# Patient Record
Sex: FEMALE | Race: WHITE | NOT HISPANIC OR LATINO | Employment: OTHER | URBAN - METROPOLITAN AREA
[De-identification: names, ages, dates, MRNs, and addresses within clinical notes are randomized per-mention and may not be internally consistent; named-entity substitution may affect disease eponyms.]

---

## 2017-01-27 ENCOUNTER — ALLSCRIPTS OFFICE VISIT (OUTPATIENT)
Dept: OTHER | Facility: OTHER | Age: 82
End: 2017-01-27

## 2018-01-09 NOTE — RESULT NOTES
Verified Results  Coumadin Flow Sheet [id number] 07:01PM Concepcion Arboleda     Test Name Result Flag Reference   Recheck INR [id number]     New Dose same       Coumadin Flow Sheet [id number] 02:55PM Concepcion Arboleda     Test Name Result Flag Reference   INR 2 1     Current Dose 1mg alt 0 5mg

## 2018-01-09 NOTE — RESULT NOTES
Verified Results  Coumadin Flow Sheet [id number] 01:23PM Kayleigh Webb     Test Name Result Flag Reference   INR 2 2     Current Dose 1mg alt 0 5mg

## 2018-01-13 NOTE — RESULT NOTES
Verified Results  Coumadin Flow Sheet [id number] 12:00AM Kelly Velázquez     Test Name Result Flag Reference   INR 2 0     Current Dose 1mg alt 0 5mg

## 2018-01-13 NOTE — PROCEDURES
Procedures by Joanna Herrera RN at 11/3/2016 11:15 AM      Author: Joanna Herrera RN Service:  (none) Author Type:  Registered Nurse     Filed:  11/3/2016 11:19 AM Date of Service:  11/3/2016 11:15 AM Status:  Signed     : Joanna Herrera RN (Registered Nurse)         Procedure Orders:       1  Insert PICC line [id number] ordered by Nikki Barrios MD at 11/02/16 1328                    Insert PICC line  Date/Time: 11/3/2016 11:16 AM  Performed by: Suzanne Gay by: Adelia Quach     Patient location:  Bedside  Consent:     Consent obtained:  Written    Consent given by:  Marie Keating (pt's son)    Procedural risks discussed: consent obtained by physician  [place] protocol:     Procedure explained and questions answered to patient or proxy's satisfaction: yes      Relevant documents present and verified: yes      Test results available and properly labeled: yes       Imaging studies available: yes      Required blood products, implants, devices, and special equipment available: yes      Site/side marked: yes      Immediately prior to procedure, a time out was called: yes      Patient identity confirmed:  Verbally with patient, arm band, provided demographic data and hospital-assigned identification number  Pre-procedure details:     Hand hygiene: Hand hygiene performed prior to insertion      Sterile barrier technique: All elements of maximal sterile technique followed      Skin preparation:  ChloraPrep    Skin preparation agent: Skin preparation agent completely dried prior to procedure    Indications:     PICC line indications: total parenteral nutrition    Anesthesia (see MAR for exact dosages):      Anesthesia method:  Local infiltration (2ml)    Local anesthetic:  Lidocaine 1% w/o epi  Procedure details:     Location:  Brachial    Vessel type: vein      Laterality:  Right    Approach: percutaneous technique used      Patient position:  Flat    Procedural supplies: Double lumen    Catheter size:  5 Fr    Landmarks identified: yes      Ultrasound guidance: yes      Sterile ultrasound techniques: Sterile gel and sterile probe covers were used      Number of attempts:  1    Successful placement: yes      Vessel of catheter tip end:  Svc    Total catheter length (cm):  39    Catheter out on skin (cm):  0    Max flow rate:  999ml/hr    Arm circumference:  32cm  Post-procedure details:     Post-procedure:  Dressing applied and securement device placed    Assessment:  Blood return through all ports and free fluid flow (placement verified with sherlock 3cg confirmation, placed on chart)    Post-procedure complications: none      Patient tolerance of procedure:   Tolerated well, no immediate complications  Comments:      Wbc 14 42 at time of insertion                     Received for:Provider  EPIC   Nov  3 2016 11:19AM Raul Hebert Standard Time

## 2018-01-14 VITALS — DIASTOLIC BLOOD PRESSURE: 73 MMHG | SYSTOLIC BLOOD PRESSURE: 126 MMHG | HEART RATE: 80 BPM

## 2018-01-15 NOTE — RESULT NOTES
Verified Results  Coumadin Flow Sheet [id number] 04:02PM Carlos Lewis     Test Name Result Flag Reference   INR 2 3     Current Dose 1mg alt 0 5mg

## 2018-01-16 NOTE — RESULT NOTES
Verified Results  Coumadin Flow Sheet [id number] 03:16PM Jemma Farah     Test Name Result Flag Reference   INR 2 8     Current Dose 0hhnda0 5mg

## 2018-01-16 NOTE — RESULT NOTES
Verified Results  Coumadin Flow Sheet [id number] 12:00AM Carlos Lewis     Test Name Result Flag Reference   INR 1 8     Current Dose 1mg alt 0 5mg

## 2018-01-18 NOTE — RESULT NOTES
Verified Results  Coumadin Flow Sheet [id number] 01:27PM Sarahorry Odalis Keller     Test Name Result Flag Reference   INR 2 2     Current Dose 1mg alt 0 5mg

## 2018-01-18 NOTE — RESULT NOTES
Verified Results  Coumadin Flow Sheet [id number] 02:45PM Kelly Velázquez     Test Name Result Flag Reference   INR 2 4     Current Dose 1mg alt 0 5mg

## 2018-04-24 ENCOUNTER — TRANSCRIBE ORDERS (OUTPATIENT)
Dept: FAMILY MEDICINE CLINIC | Facility: CLINIC | Age: 83
End: 2018-04-24

## 2018-05-14 ENCOUNTER — HOSPITAL ENCOUNTER (OUTPATIENT)
Dept: ULTRASOUND IMAGING | Facility: CLINIC | Age: 83
Discharge: HOME | End: 2018-05-14
Admitting: RADIOLOGY
Payer: MEDICARE

## 2018-05-14 ENCOUNTER — HOSPITAL ENCOUNTER (OUTPATIENT)
Dept: ULTRASOUND IMAGING | Facility: CLINIC | Age: 83
Discharge: HOME | End: 2018-05-14
Payer: MEDICARE

## 2018-05-14 ENCOUNTER — HOSPITAL ENCOUNTER (OUTPATIENT)
Dept: MAMMOGRAPHY | Facility: CLINIC | Age: 83
Discharge: HOME | End: 2018-05-14
Payer: MEDICARE

## 2018-05-14 VITALS — DIASTOLIC BLOOD PRESSURE: 60 MMHG | HEART RATE: 76 BPM | SYSTOLIC BLOOD PRESSURE: 110 MMHG

## 2018-05-14 DIAGNOSIS — N63.0 LUMP OR MASS IN BREAST: ICD-10-CM

## 2018-05-14 PROCEDURE — 88361 TUMOR IMMUNOHISTOCHEM/COMPUT: CPT | Performed by: PATHOLOGY

## 2018-05-14 PROCEDURE — 77066 DX MAMMO INCL CAD BI: CPT

## 2018-05-14 PROCEDURE — 88305 TISSUE EXAM BY PATHOLOGIST: CPT | Performed by: PATHOLOGY

## 2018-05-14 PROCEDURE — 19083 BX BREAST 1ST LESION US IMAG: CPT

## 2018-05-14 PROCEDURE — 76642 ULTRASOUND BREAST LIMITED: CPT

## 2018-05-14 RX ORDER — LIDOCAINE HYDROCHLORIDE 10 MG/ML
5 INJECTION, SOLUTION INFILTRATION; PERINEURAL ONCE
Status: COMPLETED | OUTPATIENT
Start: 2018-05-14 | End: 2018-05-14

## 2018-05-14 RX ADMIN — LIDOCAINE HYDROCHLORIDE 5 ML: 10 INJECTION, SOLUTION INFILTRATION; PERINEURAL at 15:04

## 2018-05-14 NOTE — PROGRESS NOTES
Patient arrived via:    _____ambulatory    __x___wheelchair    _____stretcher      Domestic violence screen    __x____negative______positive    Breast Implants:    _______yes ___x_____no

## 2018-05-14 NOTE — PROGRESS NOTES
Ice pack given:    __x___yes _____no    Discharge instructions signed by patient:    __x___yes _____no    Discharge instructions given to patient:    __x___yes _____no    Discharged via:    __x___amulatory    _____wheelchair    _____stretcher    Stable on discharge:    __x___yes ____no

## 2018-05-14 NOTE — PROGRESS NOTES
Procedure type:    __x___ultrasound guided _____stereotactic    Breast:    _____Left __x___Right    Location: 10:00 7cm from nipple    Needle: 14g Maxcore    # of passes: 2    Clip: Hydromark-butterfly    Performed by: Dr Samantha Ross held for 10 minutes by: Rosibel Centeno Strips:    __x___yes _____no    Band aid:    __x___yes_____no    Tape and guaze:    _____yes __x___no    Tolerated procedure:    __x___yes _____no

## 2018-05-14 NOTE — DISCHARGE INSTR - OTHER ORDERS
POST LARGE CORE BREAST BIOPSY PATIENT INFORMATION      1  Place an ice pack inside your bra over the top of the dressing every hour for 20 minutes (20 minutes on, 60 minutes off)  Do this until bedtime  2  Do not shower or bathe until the following morning  3  You may bathe your breast carefully with the steri-strips in place  Be careful    Not to loosen them  The steri-strips will fall off in 3-5 days  4  You may have mild discomfort, and you may have some bruising where the   Needle entered the skin  This should clear within 5-7 days  5  If you need medicine for discomfort, take acetaminophen products such as   Tylenol  You may also take Advil or Motrin products  6  Do not participate in strenuous activities such as-tennis, aerobics, skiing,  Weight lifting, etc  for 24 hours  Refrain from swimming/soaking for 72 hours  7  Wearing a bra for sleeping may be more comfortable for the first 24-48 hours  8  Watch for continued bleeding, pain or fever over 101; please call with any questions or concerns  For procedures done at the [facility]  [facility] [place] [facility] call:  Billy Linda RN at [phone]  Jennifer Edmond RN at [phone]                    *After 4 PM call the Interventional Radiology Department                    [phone] and ask to speak with the nurse on call  For procedures done at the [address] [place] [address] call:         Daquan Narayanan RN at   *After 4 PM call the Interventional Radiology Department   [phone] and ask to speak with the nurse on call  For procedures done at [address] [place] [address] call: The Radiology Nurse at [phone]  *After 4 PM call your physician, or go to the Emergency Department  9          The final results of your biopsy are usually available within one week

## 2018-05-15 NOTE — PROGRESS NOTES
Post procedure call completed 0n 5/15/18 at 1209    Bleeding: _____yes __x___no    Pain: _____yes ____x__no    Redness/Swelling: ___x___yes ______no    Band aid removed: ___x__yes _____no    Steri-Strips intact: __x____yes _____no   Stager with Ms Stephens's nurse at [place]  Her nurse states Ms Roel Payton has some swelling in her right breast, but denies redness or any other problems with biopsy site

## 2018-05-21 ENCOUNTER — TELEPHONE (OUTPATIENT)
Dept: MAMMOGRAPHY | Facility: CLINIC | Age: 83
End: 2018-05-21

## 2018-05-23 ENCOUNTER — OFFICE VISIT (OUTPATIENT)
Dept: SURGICAL ONCOLOGY | Facility: CLINIC | Age: 83
End: 2018-05-23
Payer: MEDICARE

## 2018-05-23 VITALS
SYSTOLIC BLOOD PRESSURE: 130 MMHG | HEART RATE: 74 BPM | TEMPERATURE: 97.9 F | HEIGHT: 57 IN | RESPIRATION RATE: 16 BRPM | WEIGHT: 140 LBS | DIASTOLIC BLOOD PRESSURE: 70 MMHG | BODY MASS INDEX: 30.2 KG/M2

## 2018-05-23 DIAGNOSIS — N63.0 BREAST MASS: Primary | ICD-10-CM

## 2018-05-23 DIAGNOSIS — C50.411 MALIGNANT NEOPLASM OF UPPER-OUTER QUADRANT OF RIGHT BREAST IN FEMALE, ESTROGEN RECEPTOR POSITIVE (HCC): ICD-10-CM

## 2018-05-23 DIAGNOSIS — Z17.0 MALIGNANT NEOPLASM OF UPPER-OUTER QUADRANT OF RIGHT BREAST IN FEMALE, ESTROGEN RECEPTOR POSITIVE (HCC): ICD-10-CM

## 2018-05-23 PROCEDURE — 99204 OFFICE O/P NEW MOD 45 MIN: CPT | Performed by: SURGERY

## 2018-05-23 RX ORDER — B-COMPLEX WITH VITAMIN C
TABLET ORAL
COMMUNITY

## 2018-05-23 NOTE — PROGRESS NOTES
Breast Consultation-Surgical Oncology     [address]  [facility] [place] [address]    Name:  Geovanny Mcbride  YOB: 1919  MRN:  [id number]    Assessment/Plan   Diagnoses and all orders for this visit:    Breast mass    Malignant neoplasm of upper-outer quadrant of right breast in female, estrogen receptor positive ([address] )        Assessment:right breast cancer, ER/AK +    Plan:referral to medical oncology, follow up in 3 months    HPI: Geovanny Mcbride is a 80y o  year old female who presents with a right breast mass  She noted this herself and was sent for diagnostic imaging  Surgical treatment to date consisted of n/a  Oncology History:     No history exists  Pertinent reproductive history:  Age at menarche:  unk  OB History      Para Term  AB Living    3 3            SAB TAB Ectopic Multiple Live Births                     Obstetric Comments    Age at first pregnancy 21          Age at first live birth:  21  Age at menopause:  unk  Hysterectomy/Oophrectomy:  Yes  Hormone replacement therapy:  No  Birth control pills:  No    Problem List:   Patient Active Problem List   Diagnosis    Abdominal pain    Depression    Deep vein thrombosis (DVT) of lower extremity (HCC)    Ischemic bowel disease ([address] )    Protein calorie malnutrition ([address] )    Anemia    Septicemia ([address] )    Breast mass    Malignant neoplasm of upper-outer quadrant of right breast in female, estrogen receptor positive ([address] )     Past Medical History:   Diagnosis Date    Abdominal aortic aneurysm without rupture ([address] )     Anxiety     Atrial fibrillation ([address] )     Blood clot in vein     Breast mass     Cyst of pancreas     Depression     Depression     Difficulty walking     Diverticulitis     DVT (deep venous thrombosis) (HCC)     Dysphagia     Dysphagia, oropharyngeal phase     Frequent urination     Functional urinary incontinence  GERD (gastroesophageal reflux disease)     Hyperlipidemia     Hypertension     Long term (current) use of anticoagulants     Metabolic encephalopathy     Personal history of other malignant neoplasm of large intestine     Unspecified protein-calorie malnutrition (HCC)     Vascular disorder of intestine ([address] )     Wears glasses     Weight loss      Past Surgical History:   Procedure Laterality Date    ABDOMINAL SURGERY      BREAST BIOPSY Right 05/14/2018    HYSTERECTOMY      IVC FILTER INSERTION  1990    KIDNEY STONE SURGERY      LAPAROTOMY N/A 11/1/2016    Procedure: EXPLORATORY LAPAROTOMY WITH EXTENSIVE LYSIS OF ADHESIONS, SEGMENTAL SMALL BOWEL RESECTION WITH PRIMARY ANASTAMOSIS, REPAIR OF MULTIPLE SMALL BOWEL ENTEROTOMIES;  Surgeon: Toro Landis MD;  Location: [facility] OR;  Service:     SPINE SURGERY       Family History   Problem Relation Age of Onset    Family history unknown: Yes     Social History     Social History    Marital status:      Spouse name: N/A    Number of children: N/A    Years of education: N/A     Occupational History    Not on file  Social History Main Topics    Smoking status: Former Smoker    Smokeless tobacco: Never Used    Alcohol use Yes      Comment: special occasions    Drug use: No    Sexual activity: Not on file     Other Topics Concern    Not on file     Social History Narrative    No narrative on file     Current Outpatient Prescriptions   Medication Sig Dispense Refill    Calcium Carbonate-Vitamin D 600-200 MG-UNIT TABS Take by mouth      Multiple Vitamin (MULTIVITAMINS PO) Take by mouth      warfarin (COUMADIN) 1 mg tablet Take by mouth      Escitalopram Oxalate (LEXAPRO PO) Take 5 mg by mouth      polyethylene glycol (GLYCOLAX) powder Take 17 g by mouth daily for 30 days 510 g 0     No current facility-administered medications for this visit        Allergies   Allergen Reactions    Ofloxacin          The following portions of the patient's history were reviewed and updated as appropriate: allergies, current medications, past family history, past medical history, past social history, past surgical history and problem list     Review of Systems:  Review of Systems   Constitutional: Positive for unexpected weight change (weight loss)  Negative for appetite change and fever  Eyes: Negative  Respiratory: Negative for shortness of breath  Cardiovascular: Negative  Gastrointestinal: Negative  Endocrine: Negative  Genitourinary: Positive for frequency  Musculoskeletal: Negative  Negative for arthralgias and myalgias  Skin: Negative  Allergic/Immunologic: Negative  Neurological: Negative  Hematological: Negative  Negative for adenopathy  Does not bruise/bleed easily  Psychiatric/Behavioral: Negative  Physical Exam:  Physical Exam   Constitutional: She is oriented to person, place, and time  She appears well-developed and well-nourished  In wheelchair   HENT:   Head: Normocephalic and atraumatic  Cardiovascular: Normal heart sounds  Pulmonary/Chest: Breath sounds normal  Right breast exhibits mass (right upper outer), skin change (well healing biopsy site with resolving ecchymosis) and tenderness  Right breast exhibits no inverted nipple and no nipple discharge  Left breast exhibits no inverted nipple, no mass, no nipple discharge, no skin change and no tenderness  Lymphadenopathy:        Right axillary: No pectoral and no lateral adenopathy present  Left axillary: No pectoral and no lateral adenopathy present  Right: No supraclavicular adenopathy present  Left: No supraclavicular adenopathy present  Neurological: She is alert and oriented to person, place, and time  Psychiatric: She has a normal mood and affect         Laboratory:    Pathology revealed: invasive ductal carcinoma    Histologic grade: moderately differentiated     Angiolymphatic invasion:  absent    Tumor node status: clinically Negative    Hormone receptor status:  estrogen receptor positive        [email or web address] bilateral diagnostic mammogram and bilateral breast ultrasound is a BI-RADS five secondary to a large greater than 3 cm mass in the upper outer right breast corresponding to the palpable region  There was also a bilobed area on the left side subcentimeter in size, decision was made not to biopsy this area secondary to patient age and expression of not wanting any surgical management  Discussion/Summary:  80-year-old female here today secondary to a right breast mass  This is a biopsy-proven carcinoma  Fortunately this is strongly estrogen and progesterone receptor positive at 100%  I discussed the overall management of breast cancer with her and her family who accompany her today  She is not interested in any surgical management  Given the strong estrogen/progesterone drive to her tumor, I am referring her to Medical Oncology to discuss anti hormone therapy  She is agreeable to this plan  I will see her again in three months to assess response  If she is clinically responding, then she would not likely need any surgical management

## 2018-05-23 NOTE — LETTER
May 23, 2018     Lenny Nair, [address] [address]    Patient: Jean Claude Cooper   YOB: 1919   Date of Visit: 2018       Dear Dr Alas King: Thank you for referring Jean Claude Cooper to me for evaluation  Below are my notes for this consultation  If you have questions, please do not hesitate to call me  I look forward to following your patient along with you  Sincerely,        Katlyn Gordon MD        CC: MD Katlyn Lewis MD  2018  2:28 PM  Sign at close encounter    Breast Consultation-Surgical Oncology     [address] [facility] [place] [address]    Name:  Jean Claude Cooper  YOB: 1919  MRN:  [id number]    Assessment/Plan   Diagnoses and all orders for this visit:    Breast mass    Malignant neoplasm of upper-outer quadrant of right breast in female, estrogen receptor positive ([address] )        Assessment:right breast cancer, ER/[place] +    Plan:referral to medical oncology, follow up in 3 months    HPI: Jean Claude Cooper is a 80y o  year old female who presents with a right breast mass  She noted this herself and was sent for diagnostic imaging  Surgical treatment to date consisted of n/a  Oncology History:     No history exists  Pertinent reproductive history:  Age at menarche:  unk  OB History      Para Term  AB Living    3 3            SAB TAB Ectopic Multiple Live Births                     Obstetric Comments    Age at first pregnancy 21          Age at first live birth:  21  Age at menopause:  unk  Hysterectomy/Oophrectomy:  Yes  Hormone replacement therapy:  No  Birth control pills:  No    Problem List:   Patient Active Problem List   Diagnosis    Abdominal pain    Depression    Deep vein thrombosis (DVT) of lower extremity (HCC)    Ischemic bowel disease ([address] )    Protein calorie malnutrition ([address] )    Anemia    Septicemia [facility]    Breast mass    Malignant neoplasm of upper-outer quadrant of right breast in female, estrogen receptor positive ([facility] )     Past Medical History:   Diagnosis Date    Abdominal aortic aneurysm without rupture (HCC)     Anxiety     Atrial fibrillation (HCC)     Blood clot in vein     Breast mass     Cyst of pancreas     Depression     Depression     Difficulty walking     Diverticulitis     DVT (deep venous thrombosis) (HCC)     Dysphagia     Dysphagia, oropharyngeal phase     Frequent urination     Functional urinary incontinence     GERD (gastroesophageal reflux disease)     Hyperlipidemia     Hypertension     Long term (current) use of anticoagulants     Metabolic encephalopathy     Personal history of other malignant neoplasm of large intestine     Unspecified protein-calorie malnutrition (HCC)     Vascular disorder of intestine ([facility] )     Wears glasses     Weight loss      Past Surgical History:   Procedure Laterality Date    ABDOMINAL SURGERY      BREAST BIOPSY Right 05/14/2018    HYSTERECTOMY      IVC FILTER INSERTION  1990    KIDNEY STONE SURGERY      LAPAROTOMY N/A 11/1/2016    Procedure: EXPLORATORY LAPAROTOMY WITH EXTENSIVE LYSIS OF ADHESIONS, SEGMENTAL SMALL BOWEL RESECTION WITH PRIMARY ANASTAMOSIS, REPAIR OF MULTIPLE SMALL BOWEL ENTEROTOMIES;  Surgeon: Remedios Kirk MD;  Location: [facility];  Service:     SPINE SURGERY       Family History   Problem Relation Age of Onset    Family history unknown: Yes     Social History     Social History    Marital status:      Spouse name: N/A    Number of children: N/A    Years of education: N/A     Occupational History    Not on file       Social History Main Topics    Smoking status: Former Smoker    Smokeless tobacco: Never Used    Alcohol use Yes      Comment: special occasions    Drug use: No    Sexual activity: Not on file     Other Topics Concern    Not on file     Social History Narrative    No narrative on file     Current Outpatient Prescriptions   Medication Sig Dispense Refill    Calcium Carbonate-Vitamin D 600-200 MG-UNIT TABS Take by mouth      Multiple Vitamin (MULTIVITAMINS PO) Take by mouth      warfarin (COUMADIN) 1 mg tablet Take by mouth      Escitalopram Oxalate (LEXAPRO PO) Take 5 mg by mouth      polyethylene glycol (GLYCOLAX) powder Take 17 g by mouth daily for 30 days 510 g 0     No current facility-administered medications for this visit  Allergies   Allergen Reactions    Ofloxacin          The following portions of the patient's history were reviewed and updated as appropriate: allergies, current medications, past family history, past medical history, past social history, past surgical history and problem list     Review of Systems:  Review of Systems   Constitutional: Positive for unexpected weight change (weight loss)  Negative for appetite change and fever  Eyes: Negative  Respiratory: Negative for shortness of breath  Cardiovascular: Negative  Gastrointestinal: Negative  Endocrine: Negative  Genitourinary: Positive for frequency  Musculoskeletal: Negative  Negative for arthralgias and myalgias  Skin: Negative  Allergic/Immunologic: Negative  Neurological: Negative  Hematological: Negative  Negative for adenopathy  Does not bruise/bleed easily  Psychiatric/Behavioral: Negative  Physical Exam:  Physical Exam   Constitutional: She is oriented to person, place, and time  She appears well-developed and well-nourished  In wheelchair   HENT:   Head: Normocephalic and atraumatic  Cardiovascular: Normal heart sounds  Pulmonary/Chest: Breath sounds normal  Right breast exhibits mass (right upper outer), skin change (well healing biopsy site with resolving ecchymosis) and tenderness  Right breast exhibits no inverted nipple and no nipple discharge   Left breast exhibits no inverted nipple, no mass, no nipple discharge, no skin change and no tenderness  Lymphadenopathy:        Right axillary: No pectoral and no lateral adenopathy present  Left axillary: No pectoral and no lateral adenopathy present  Right: No supraclavicular adenopathy present  Left: No supraclavicular adenopathy present  Neurological: She is alert and oriented to person, place, and time  Psychiatric: She has a normal mood and affect  Laboratory:    Pathology revealed: invasive ductal carcinoma    Histologic grade: moderately differentiated     Angiolymphatic invasion:  absent    Tumor node status: clinically Negative    Hormone receptor status:  estrogen receptor positive        [email or web address] bilateral diagnostic mammogram and bilateral breast ultrasound is a BI-RADS five secondary to a large greater than 3 cm mass in the upper outer right breast corresponding to the palpable region  There was also a bilobed area on the left side subcentimeter in size, decision was made not to biopsy this area secondary to patient age and expression of not wanting any surgical management  Discussion/Summary:  51-year-old female here today secondary to a right breast mass  This is a biopsy-proven carcinoma  Fortunately this is strongly estrogen and progesterone receptor positive at 100%  I discussed the overall management of breast cancer with her and her family who accompany her today  She is not interested in any surgical management  Given the strong estrogen/progesterone drive to her tumor, I am referring her to Medical Oncology to discuss anti hormone therapy  She is agreeable to this plan  I will see her again in three months to assess response  If she is clinically responding, then she would not likely need any surgical management

## 2018-05-24 ENCOUNTER — OFFICE VISIT (OUTPATIENT)
Dept: HEMATOLOGY ONCOLOGY | Facility: CLINIC | Age: 83
End: 2018-05-24
Payer: MEDICARE

## 2018-05-24 VITALS
BODY MASS INDEX: 30.2 KG/M2 | WEIGHT: 140 LBS | TEMPERATURE: 98 F | RESPIRATION RATE: 18 BRPM | DIASTOLIC BLOOD PRESSURE: 64 MMHG | HEIGHT: 57 IN | HEART RATE: 78 BPM | SYSTOLIC BLOOD PRESSURE: 130 MMHG | OXYGEN SATURATION: 97 %

## 2018-05-24 DIAGNOSIS — C50.411 MALIGNANT NEOPLASM OF UPPER-OUTER QUADRANT OF RIGHT BREAST IN FEMALE, ESTROGEN RECEPTOR POSITIVE (HCC): Primary | ICD-10-CM

## 2018-05-24 DIAGNOSIS — Z17.0 MALIGNANT NEOPLASM OF UPPER-OUTER QUADRANT OF RIGHT BREAST IN FEMALE, ESTROGEN RECEPTOR POSITIVE (HCC): Primary | ICD-10-CM

## 2018-05-24 PROCEDURE — 99205 OFFICE O/P NEW HI 60 MIN: CPT | Performed by: INTERNAL MEDICINE

## 2018-05-24 NOTE — LETTER
May 24, 2018     Emma Merrill MD  [address]    Patient: Caridad Hyaes   YOB: 1919   Date of Visit: 5/24/2018       Dear Dr Jane Brandon: Thank you for referring Caridad Hayes to me for evaluation  Below are my notes for this consultation  If you have questions, please do not hesitate to call me  I look forward to following your patient along with you  Sincerely,        Fallon Robertson MD        CC: MD Fallon Calhoun MD  5/24/2018  9:54 AM  Sign at close encounter  Hematology / Oncology Outpatient Consult Note    Caridad Hayes 80 y o  female [id number]         Date:  5/24/2018    Assessment / Plan:  A 80-year-old postmenopausal woman with newly diagnosed clinical stage IIA right breast cancer, grade 2, % positive, % positive, HER2 negative disease  She does not wish to have surgical treatment for breast cancer  She presents today with her son to discuss medical treatment for the breast cancer  We had extensive discussion regarding the diagnosis, clinical staging, tumor phenotype, relatively slow-growing disease, prognosis and treatment options  Because of her age, breast cancer may or may not be, life-threatening disease  I agree with her decision not to have surgical treatment  I recommended her to have hormonal therapy with anastrozole  Side effects of anastrozole was thoroughly discussed, including but not limited to hot flashes, musculoskeletal symptoms and bone mineral density loss  I recommended her to continue with calcium vitamin-D which she is already on  She and her son and daughter-in-law are in agreement with my recommendation  I will see her again in 4 months for routine follow-up to evaluate tumor response  All the patient and her family's questions were answered to their satisfaction            Subjective:     HPI:  A 80year old postmenopausal woman who recently noticed a lump in the right breast which she brought to medical attention  Mammography and ultrasound showed approximately 3 cm of mass, located at 10 o'clock position from nipple in the right breast   She underwent right breast ultrasound-guided biopsy which showed invasive ductal carcinoma, grade 2  This was % positive, % positive, HER2 negative disease  She was seen by Dr Chino El yesterday  She does not wish to have surgical treatment for the breast cancer  Therefore, she presents today to discuss medical treatment  She has no breast related symptomatology  She denied any pain  She has no respiratory symptoms  She has baseline ambulatory dysfunction  She normally use a walker for ambulation  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  Her performance status is 2/4 on the ECOG scale  Interval History:          Objective:     Primary Diagnosis:    Clinical stage IIA right breast cancer, grade 2, % positive, % positive, HER2 negative disease  Diagnosed in May 2018  Cancer Staging:  Cancer Staging  Malignant neoplasm of upper-outer quadrant of right breast in female, estrogen receptor positive ([facility] [address] )  Staging form: Breast, AJCC 8th Edition  - Clinical: Stage IB (cT2, cN0, cM0, G2, ER: Positive, VA: Positive, HER2: Negative) - Signed by Anisha Martinez MD on 5/23/2018        Previous Hematologic/ Oncologic Treatment:         Current Hematologic/ Oncologic Treatment:      Hormonal therapy with anastrozole to be started in May 2018  Disease Status:     Not evaluated at this time  Test Results:    Pathology:    Right breast biopsy showed invasive ductal carcinoma, grade 2  % positive, % positive, HER2 negative disease      Radiology:    Mammography and ultrasound showed a 3 3 times to use x 2 cm of right breast mass located at 10 o'clock position 7 cm from nipple in the right breast     Laboratory:        Physical Exam:      General Appearance:    Alert, oriented        Eyes:    PERRL   Ears:    Normal external ear canals, both ears   Nose:   Nares normal, septum midline   Throat:   Mucosa moist  Pharynx without injection  Neck:   Supple       Lungs:     Clear to auscultation bilaterally   Chest Wall:    No tenderness or deformity    Heart:    Regular rate and rhythm       Abdomen:     Soft, non-tender, bowel sounds +, no organomegaly           Extremities:   Extremities no cyanosis or edema       Skin:   no rash or icterus  Lymph nodes:   Cervical, supraclavicular, and axillary nodes normal   Neurologic:   CNII-XII intact, normal strength, sensation and reflexes     Throughout          Breast exam:   3 x 3 cm of mass at outer upper quadrant of the right breast   Left breast exam is negative  ROS: Review of Systems   Neurological:        Ambulatory dysfunction   All other systems reviewed and are negative  Imaging: Us Guided Breast Biopsy Right Complete    Result Date: 5/15/2018  Narrative: Post biopsy  US Guided Breast Biopsy Right: May 14, 2018 - Check In #: [de-identified] Technologist: Akira Mosher RDMS Clinical history: 70-year-old woman presenting for ultrasound-guided biopsy right breast 10:00 mass  Prior to the procedure, I had an in-depth conversation with the patient, her daughter-in-law and her son/power of Fransisco  I explained my concern that there is a very high probability of malignancy in the right breast   Both the patient and her son indicated that she would most likely not want surgical intervention  Additionally, left breast 2-3 o'clock lesion is probably benign, but regardless would not warrant further intervention if patient is not pursuing surgical intervention for the right breast  My rationale, procedure, risks, benefits and alternatives were discussed in detail with the patient, her daughter-in-law and son/Fransisco BROOKS    The patient did not have decision making capacity for consenting to this biopsy procedure, as evidenced by an inability to recall and comprehend the risks of the procedure  The patient assented to the procedure  Informed consent was obtained from her power of   She was taken to the ultrasound suite and was positioned for an ultrasound-guided core biopsy of the right breast   The previously described 10:00 mass was again identified with ultrasound  Betadine was used as sterile prep and 1% lidocaine as local anesthetic  After a small skin incision was made, a 14 gauge Bard Max-Core biopsy needle was advanced to the lesion under ultrasound guidance  2 core biopsies were then obtained with ultrasound confirming the core needle traversing the lesion  The core specimens were then sent to Pathology for further evaluation  A hydromark butterfly clip was then deployed in order to gagandeep the biopsy site to facilitate future intervention if necessary  Post-biopsy mammogram demonstrates clip concordance with previously described mammographic finding  The patient tolerated the procedure well and left the department in stable condition  IMPRESSION:1   Uncomplicated ultrasound-guided core needle biopsy right breast 10:00 mass, with placement of a hydromark butterfly clip  2   Left breast 2-3 o'clock nodule is of low suspicion (4A)  Management of this will be deferred until results of the current core biopsy are available and until after surgical consultation has occurred  As discussed above, assuming that the patient does not pursue surgical intervention on the right breast, then findings in both breasts could be followed if clinically desired  Pathology Results: Pending Mammo Post Biopsy: Right Breast - May 14, 2018 - Check In #: [de-identified] CC and ML view(s) were taken of the right breast  Technologist: BERNA Danielle (BERNA)(M) Recommendation: Pathology pending  Transcription Location: [address] [place] [address]: [id number]    Mammo Post Biopsy    Result Date: 5/15/2018  Narrative: Post biopsy   [address] Guided Breast Biopsy Right: May 14, 2018 - Check In #: [de-identified] Technologist: Jayme Shaw RDMS Clinical history: 44-year-old woman presenting for ultrasound-guided biopsy right breast 10:00 mass  Prior to the procedure, I had an in-depth conversation with the patient, her daughter-in-law and her son/power of , America Granados  I explained my concern that there is a very high probability of malignancy in the right breast   Both the patient and her son indicated that she would most likely not want surgical intervention  Additionally, left breast 2-3 o'clock lesion is probably benign, but regardless would not warrant further intervention if patient is not pursuing surgical intervention for the right breast  My rationale, procedure, risks, benefits and alternatives were discussed in detail with the patient, her daughter-in-law and son/POA, America Granados  The patient did not have decision making capacity for consenting to this biopsy procedure, as evidenced by an inability to recall and comprehend the risks of the procedure  The patient assented to the procedure  Informed consent was obtained from her power of   She was taken to the ultrasound suite and was positioned for an ultrasound-guided core biopsy of the right breast   The previously described 10:00 mass was again identified with ultrasound  Betadine was used as sterile prep and 1% lidocaine as local anesthetic  After a small skin incision was made, a 14 gauge Bard Max-Core biopsy needle was advanced to the lesion under ultrasound guidance  2 core biopsies were then obtained with ultrasound confirming the core needle traversing the lesion  The core specimens were then sent to Pathology for further evaluation  A hydromark butterfly clip was then deployed in order to gagandeep the biopsy site to facilitate future intervention if necessary  Post-biopsy mammogram demonstrates clip concordance with previously described mammographic finding   The patient tolerated the procedure well and left the department in stable condition  IMPRESSION:1   Uncomplicated ultrasound-guided core needle biopsy right breast 10:00 mass, with placement of a hydromark butterfly clip  2   Left breast 2-3 o'clock nodule is of low suspicion (4A)  Management of this will be deferred until results of the current core biopsy are available and until after surgical consultation has occurred  As discussed above, assuming that the patient does not pursue surgical intervention on the right breast, then findings in both breasts could be followed if clinically desired  Pathology Results: Pending Mammo Post Biopsy: Right Breast - May 14, 2018 - Check In #: [de-identified] CC and ML view(s) were taken of the right breast  Technologist: BERNA Meeks (BERNA)(M) Recommendation: Pathology pending  Transcription Location: [address] [place] [address]: [id number]    Mammo Diagnostic Bilateral W Cad    Result Date: 5/14/2018  Narrative: Patient History: Patient is postmenopausal  Reason for exam: clinical finding  Mammo Diagnostic Bilateral W CAD: May 14, 2018 - Check In #: [de-identified] Bilateral MLO and CC view(s) were taken  ML view(s) were taken of the right breast  Technologist: BERNA Meeks (BERNA)(M) No prior studies available for comparison  There are scattered fibroglandular densities  There is a palpable marker overlying the upper outer right breast with underlying 3 6 x 2 4 cm lobulated mass  There are prominent tubular structures in the retroareolar right breast   There is an 8 mm bilobed mass in the upper outer left breast middle 3rd in depth  There are scattered benign-appearing calcifications bilaterally  There are prominent left axillary lymph nodes  Targeted sonographic evaluation of the area of palpable concern right breast 10 o'clock position 7 cm from the nipple reveals a 3 3 x 2 x 2 cm hypoechoic mass with increased internal vascularity  This corresponds with the mammographic finding and has irregular margins  There is an ultrasound-guided sampling is recommended  Targeted sonographic evaluation of the retroareolar right breast demonstrates multiple ducts without intraductal filling defect  There are no enlarged suspicious right axillary lymph nodes  Targeted sonographic evaluation of the 2 to 3 o'clock position left breast 8 cm from the nipple are healed a 5 x 3 x 3 mm hypoechoic lesion with slightly irregular margins  This corresponds with the mammographic finding  On discussing findings with the patient the patient and her son stated they did not desire surgery  Therefore biopsy of the left-sided lesion is not warranted at this time  If decision on surgery changes potential biopsy of the left-sided lesion could be revisited at that time  US Breast Right Limited: May 14, 2018 - Check In #: [de-identified] Standard views  Technologist: Mason Milton RDMS A uniformly echogenic layer of fibroglandular tissue  US Breast Left Limited: May 14, 2018 - Check In #: [de-identified] Standard views  Technologist: Mason Milton RDMS ACR BI-RADS® Assessments: BiRad:5 - Highly suggestive of malignancy (Overall) Diag Mammogram: Right breast finding is BiRad:5 - highly suggestive of malignancy  Right breast Right Brst US: Left breast finding is BiRad:5 - highly suggestive of malignancy  Recommendation: Ultrasound-guided biopsy of the right breast  Analyzed by CAD The patient is scheduled in a reminder system for screening mammography  Transcription Location: [facility]: [id number] Risk Value(s): Myriad Table: 1 5%    Us Breast Left Limited (diagnostic)    Result Date: 5/22/2018  Narrative: Patient History: Patient is postmenopausal  Reason for exam: clinical finding  Mammo Diagnostic Bilateral W CAD: May 14, 2018 - Check In #: [de-identified] Bilateral MLO and CC view(s) were taken  ML view(s) were taken of the right breast  Technologist: BERNA German (R)(M) No prior studies available for comparison   There are scattered fibroglandular densities  There is a palpable marker overlying the upper outer right breast with underlying 3 6 x 2 4 cm lobulated mass  There are prominent tubular structures in the retroareolar right breast   There is an 8 mm bilobed mass in the upper outer left breast middle 3rd in depth  There are scattered benign-appearing calcifications bilaterally  There are prominent left axillary lymph nodes  Targeted sonographic evaluation of the area of palpable concern right breast 10 o'clock position 7 cm from the nipple reveals a 3 3 x 2 x 2 cm hypoechoic mass with increased internal vascularity  This corresponds with the mammographic finding and has irregular margins  There is an ultrasound-guided sampling is recommended  Targeted sonographic evaluation of the retroareolar right breast demonstrates multiple ducts without intraductal filling defect  There are no enlarged suspicious right axillary lymph nodes  Targeted sonographic evaluation of the 2 to 3 o'clock position left breast 8 cm from the nipple are healed a 5 x 3 x 3 mm hypoechoic lesion with slightly irregular margins  This corresponds with the mammographic finding  On discussing findings with the patient the patient and her son stated they did not desire surgery  Therefore biopsy of the left-sided lesion is not warranted at this time  If decision on surgery changes potential biopsy of the left-sided lesion could be revisited at that time  US Breast Right Limited: May 14, 2018 - Check In #: [de-identified] Standard views  Technologist: Belen Bowers RDMS A uniformly echogenic layer of fibroglandular tissue  US Breast Left Limited: May 14, 2018 - Check In #: [de-identified] Standard views  Technologist: Belen Bowers RDMS ACR BI-RADS® Assessments: BiRad:5 - Highly suggestive of malignancy (Overall) Diag Mammogram: Right breast finding is BiRad:5 - highly suggestive of malignancy   Right breast Right Brst US: Left breast finding is BiRad:5 - highly suggestive of malignancy  Recommendation: Ultrasound-guided biopsy of the right breast  Analyzed by CAD The patient is scheduled in a reminder system for screening mammography  Transcription Location: [address] [place] [address] [place]: [id number] Risk Value(s): Myriad Table: 1 5%    Us Breast Right Limited (diagnostic)    Result Date: 5/14/2018  Narrative: Patient History: Patient is postmenopausal  Reason for exam: clinical finding  Mammo Diagnostic Bilateral W CAD: May 14, 2018 - Check In #: [de-identified] Bilateral MLO and CC view(s) were taken  ML view(s) were taken of the right breast  Technologist: BERNA Camacho (R)(M) No prior studies available for comparison  There are scattered fibroglandular densities  There is a palpable marker overlying the upper outer right breast with underlying 3 6 x 2 4 cm lobulated mass  There are prominent tubular structures in the retroareolar right breast   There is an 8 mm bilobed mass in the upper outer left breast middle 3rd in depth  There are scattered benign-appearing calcifications bilaterally  There are prominent left axillary lymph nodes  Targeted sonographic evaluation of the area of palpable concern right breast 10 o'clock position 7 cm from the nipple reveals a 3 3 x 2 x 2 cm hypoechoic mass with increased internal vascularity  This corresponds with the mammographic finding and has irregular margins  There is an ultrasound-guided sampling is recommended  Targeted sonographic evaluation of the retroareolar right breast demonstrates multiple ducts without intraductal filling defect  There are no enlarged suspicious right axillary lymph nodes  Targeted sonographic evaluation of the 2 to 3 o'clock position left breast 8 cm from the nipple are healed a 5 x 3 x 3 mm hypoechoic lesion with slightly irregular margins  This corresponds with the mammographic finding  On discussing findings with the patient the patient and her son stated they did not desire surgery    Therefore biopsy of the left-sided lesion is not warranted at this time  If decision on surgery changes potential biopsy of the left-sided lesion could be revisited at that time  US Breast Right Limited: May 14, 2018 - Check In #: [de-identified] Standard views  Technologist: Ryan Ashford RDMS A uniformly echogenic layer of fibroglandular tissue  US Breast Left Limited: May 14, 2018 - Check In #: [de-identified] Standard views  Technologist: Ryan Ashford RDMS ACR BI-RADS® Assessments: BiRad:5 - Highly suggestive of malignancy (Overall) Diag Mammogram: Right breast finding is BiRad:5 - highly suggestive of malignancy  Right breast Right Brst US: Left breast finding is BiRad:5 - highly suggestive of malignancy  Recommendation: Ultrasound-guided biopsy of the right breast  Analyzed by CAD The patient is scheduled in a reminder system for screening mammography  Transcription Location: [address] [place], [place] [address]: [id number] Risk Value(s): Myriad Table: 1 5%        Labs:   Lab Results   Component Value Date    WBC 5 11 11/21/2016    HGB 7 8 (L) 11/21/2016    HCT 24 8 (L) 11/21/2016    MCV 92 11/21/2016     11/21/2016     Lab Results   Component Value Date     11/21/2016    K 4 1 11/21/2016     11/21/2016    CO2 29 11/21/2016    ANIONGAP 6 11/21/2016    BUN 48 (H) 11/21/2016    CREATININE 1 33 (H) 11/21/2016    GLUCOSE 111 11/21/2016    CALCIUM 8 4 11/21/2016    AST 25 11/20/2016    ALT 16 11/20/2016    ALKPHOS 151 (H) 11/20/2016    PROT 6 7 11/20/2016    BILITOT 0 35 11/20/2016    EGFR 36 9 11/21/2016         Lab Results   Component Value Date    IRON 50 11/14/2016    TIBC 150 (L) 11/14/2016    FERRITIN 87 11/14/2016           Vital Sign:    Body surface area is 1 55 meters squared      Wt Readings from Last 3 Encounters:   05/24/18 63 5 kg (140 lb)   05/23/18 63 5 kg (140 lb)   11/21/16 83 1 kg (183 lb 3 2 oz)        Temp Readings from Last 3 Encounters:   05/24/18 98 °F (36 7 °C) (Tympanic)   05/23/18 97 9 °F (36 6 °C)   12/06/16 98 5 °F (36 9 °C)        BP Readings from Last 3 Encounters:   05/24/18 130/64   05/23/18 130/70   05/14/18 110/60         Pulse Readings from Last 3 Encounters:   05/24/18 78   05/23/18 74   05/14/18 76     @LASTSAO2(3)@    Active Problems:   Patient Active Problem List   Diagnosis    Abdominal pain    Depression    Deep vein thrombosis (DVT) of lower extremity (HCC)    Ischemic bowel disease (HCC)    Protein calorie malnutrition (HCC)    Anemia    Septicemia ([address] )    Breast mass    Malignant neoplasm of upper-outer quadrant of right breast in female, estrogen receptor positive ([address] )       Past Medical History:   Past Medical History:   Diagnosis Date    Abdominal aortic aneurysm without rupture (HCC)     Anxiety     Atrial fibrillation ([address] )     Blood clot in vein     Breast mass     Cyst of pancreas     Depression     Depression     Difficulty walking     Diverticulitis     DVT (deep venous thrombosis) (HCC)     Dysphagia     Dysphagia, oropharyngeal phase     Frequent urination     Functional urinary incontinence     GERD (gastroesophageal reflux disease)     Hyperlipidemia     Hypertension     Long term (current) use of anticoagulants     Metabolic encephalopathy     Personal history of other malignant neoplasm of large intestine     Unspecified protein-calorie malnutrition ([address] )     Vascular disorder of intestine ([address] )     Wears glasses     Weight loss        Surgical History:   Past Surgical History:   Procedure Laterality Date    ABDOMINAL SURGERY      BREAST BIOPSY Right 05/14/2018    HYSTERECTOMY      IVC FILTER INSERTION  1990    KIDNEY STONE SURGERY      LAPAROTOMY N/A 11/1/2016    Procedure: EXPLORATORY LAPAROTOMY WITH EXTENSIVE LYSIS OF ADHESIONS, SEGMENTAL SMALL BOWEL RESECTION WITH PRIMARY ANASTAMOSIS, REPAIR OF MULTIPLE SMALL BOWEL ENTEROTOMIES;  Surgeon: Adeola Sanders MD;  Location: [facility];  Service:     SPINE SURGERY         Family History:    Family History Problem Relation Age of Onset    Family history unknown: Yes       Family history is unknown by patient  Social History:   Social History     Social History    Marital status:      Spouse name: N/A    Number of children: N/A    Years of education: N/A     Occupational History    Not on file  Social History Main Topics    Smoking status: Former Smoker    Smokeless tobacco: Never Used    Alcohol use Yes      Comment: special occasions    Drug use: No    Sexual activity: Not on file     Other Topics Concern    Not on file     Social History Narrative    No narrative on file       Current Medications:   Current Outpatient Prescriptions   Medication Sig Dispense Refill    Calcium Carbonate-Vitamin D 600-200 MG-UNIT TABS Take by mouth      Escitalopram Oxalate (LEXAPRO PO) Take 5 mg by mouth      Multiple Vitamin (MULTIVITAMINS PO) Take by mouth      warfarin (COUMADIN) 1 mg tablet Take by mouth      polyethylene glycol (GLYCOLAX) powder Take 17 g by mouth daily for 30 days 510 g 0     No current facility-administered medications for this visit  Allergies:    Allergies   Allergen Reactions    Ofloxacin

## 2018-05-24 NOTE — PROGRESS NOTES
Hematology / Oncology Outpatient Consult Note    Eliu Gonzales 80 y o  female [id number]         Date:  5/24/2018    Assessment / Plan:  A 80-year-old postmenopausal woman with newly diagnosed clinical stage IIA right breast cancer, grade 2, % positive, % positive, HER2 negative disease  She does not wish to have surgical treatment for breast cancer  She presents today with her son to discuss medical treatment for the breast cancer  We had extensive discussion regarding the diagnosis, clinical staging, tumor phenotype, relatively slow-growing disease, prognosis and treatment options  Because of her age, breast cancer may or may not be, life-threatening disease  I agree with her decision not to have surgical treatment  I recommended her to have hormonal therapy with anastrozole  Side effects of anastrozole was thoroughly discussed, including but not limited to hot flashes, musculoskeletal symptoms and bone mineral density loss  I recommended her to continue with calcium vitamin-D which she is already on  She and her son and daughter-in-law are in agreement with my recommendation  I will see her again in 4 months for routine follow-up to evaluate tumor response  All the patient and her family's questions were answered to their satisfaction  Subjective:     HPI:  A 80year old postmenopausal woman who recently noticed a lump in the right breast which she brought to medical attention  Mammography and ultrasound showed approximately 3 cm of mass, located at 10 o'clock position from nipple in the right breast   She underwent right breast ultrasound-guided biopsy which showed invasive ductal carcinoma, grade 2  This was % positive, % positive, HER2 negative disease  She was seen by Dr Ming Mejia yesterday  She does not wish to have surgical treatment for the breast cancer  Therefore, she presents today to discuss medical treatment    She has no breast related symptomatology  She denied any pain  She has no respiratory symptoms  She has baseline ambulatory dysfunction  She normally use a walker for ambulation  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  Her performance status is 2/4 on the ECOG scale  Interval History:          Objective:     Primary Diagnosis:    Clinical stage IIA right breast cancer, grade 2, % positive, % positive, HER2 negative disease  Diagnosed in May 2018  Cancer Staging:  Cancer Staging  Malignant neoplasm of upper-outer quadrant of right breast in female, estrogen receptor positive ([facility] [address] )  Staging form: Breast, AJCC 8th Edition  - Clinical: Stage IB (cT2, cN0, cM0, G2, ER: Positive, IA: Positive, HER2: Negative) - Signed by Medina Shelton MD on 5/23/2018        Previous Hematologic/ Oncologic Treatment:         Current Hematologic/ Oncologic Treatment:      Hormonal therapy with anastrozole to be started in May 2018  Disease Status:     Not evaluated at this time  Test Results:    Pathology:    Right breast biopsy showed invasive ductal carcinoma, grade 2  % positive, % positive, HER2 negative disease  Radiology:    Mammography and ultrasound showed a 3 3 times to use x 2 cm of right breast mass located at 10 o'clock position 7 cm from nipple in the right breast     Laboratory:        Physical Exam:      General Appearance:    Alert, oriented        Eyes:    PERRL   Ears:    Normal external ear canals, both ears   Nose:   Nares normal, septum midline   Throat:   Mucosa moist  Pharynx without injection  Neck:   Supple       Lungs:     Clear to auscultation bilaterally   Chest Wall:    No tenderness or deformity    Heart:    Regular rate and rhythm       Abdomen:     Soft, non-tender, bowel sounds +, no organomegaly           Extremities:   Extremities no cyanosis or edema       Skin:   no rash or icterus      Lymph nodes:   Cervical, supraclavicular, and axillary nodes normal   Neurologic:   CNII-XII intact, normal strength, sensation and reflexes     Throughout          Breast exam:   3 x 3 cm of mass at outer upper quadrant of the right breast   Left breast exam is negative  ROS: Review of Systems   Neurological:        Ambulatory dysfunction   All other systems reviewed and are negative  Imaging: Us Guided Breast Biopsy Right Complete    Result Date: 5/15/2018  Narrative: Post biopsy  US Guided Breast Biopsy Right: May 14, 2018 - Check In #: [de-identified] Technologist: Negrita Lazcano RDMS Clinical history: 49-year-old woman presenting for ultrasound-guided biopsy right breast 10:00 mass  Prior to the procedure, I had an in-depth conversation with the patient, her daughter-in-law and her son/power of , Barbara Argueta  I explained my concern that there is a very high probability of malignancy in the right breast   Both the patient and her son indicated that she would most likely not want surgical intervention  Additionally, left breast 2-3 o'clock lesion is probably benign, but regardless would not warrant further intervention if patient is not pursuing surgical intervention for the right breast  My rationale, procedure, risks, benefits and alternatives were discussed in detail with the patient, her daughter-in-law and son/POA, Barbara Argueta  The patient did not have decision making capacity for consenting to this biopsy procedure, as evidenced by an inability to recall and comprehend the risks of the procedure  The patient assented to the procedure  Informed consent was obtained from her power of   She was taken to the ultrasound suite and was positioned for an ultrasound-guided core biopsy of the right breast   The previously described 10:00 mass was again identified with ultrasound  Betadine was used as sterile prep and 1% lidocaine as local anesthetic    After a small skin incision was made, a 14 gauge Bard Max-Core biopsy needle was advanced to the lesion under ultrasound guidance  2 core biopsies were then obtained with ultrasound confirming the core needle traversing the lesion  The core specimens were then sent to Pathology for further evaluation  A hydromark butterfly clip was then deployed in order to gagandeep the biopsy site to facilitate future intervention if necessary  Post-biopsy mammogram demonstrates clip concordance with previously described mammographic finding  The patient tolerated the procedure well and left the department in stable condition  IMPRESSION:1   Uncomplicated ultrasound-guided core needle biopsy right breast 10:00 mass, with placement of a hydromark butterfly clip  2   Left breast 2-3 o'clock nodule is of low suspicion (4A)  Management of this will be deferred until results of the current core biopsy are available and until after surgical consultation has occurred  As discussed above, assuming that the patient does not pursue surgical intervention on the right breast, then findings in both breasts could be followed if clinically desired  Pathology Results: Pending Mammo Post Biopsy: Right Breast - May 14, 2018 - Check In #: [de-identified] CC and ML view(s) were taken of the right breast  Technologist: BERNA Phillips (BERNA)(M) Recommendation: Pathology pending  Transcription Location: [facility]: [id number]    Mammo Post Biopsy    Result Date: 5/15/2018  Narrative: Post biopsy  US Guided Breast Biopsy Right: May 14, 2018 - Check In #: [de-identified] Technologist: Christofer Lane RDMS Clinical history: 69-year-old woman presenting for ultrasound-guided biopsy right breast 10:00 mass  Prior to the procedure, I had an in-depth conversation with the patient, her daughter-in-law and her son/power of , Faustino Good  I explained my concern that there is a very high probability of malignancy in the right breast   Both the patient and her son indicated that she would most likely not want surgical intervention   Additionally, left breast 2-3 o'clock lesion is probably benign, but regardless would not warrant further intervention if patient is not pursuing surgical intervention for the right breast  My rationale, procedure, risks, benefits and alternatives were discussed in detail with the patient, her daughter-in-law and son/POA, Kelsea Strickland  The patient did not have decision making capacity for consenting to this biopsy procedure, as evidenced by an inability to recall and comprehend the risks of the procedure  The patient assented to the procedure  Informed consent was obtained from her power of   She was taken to the ultrasound suite and was positioned for an ultrasound-guided core biopsy of the right breast   The previously described 10:00 mass was again identified with ultrasound  Betadine was used as sterile prep and 1% lidocaine as local anesthetic  After a small skin incision was made, a 14 gauge Bard Max-Core biopsy needle was advanced to the lesion under ultrasound guidance  2 core biopsies were then obtained with ultrasound confirming the core needle traversing the lesion  The core specimens were then sent to Pathology for further evaluation  A hydromark butterfly clip was then deployed in order to gagandeep the biopsy site to facilitate future intervention if necessary  Post-biopsy mammogram demonstrates clip concordance with previously described mammographic finding  The patient tolerated the procedure well and left the department in stable condition  IMPRESSION:1   Uncomplicated ultrasound-guided core needle biopsy right breast 10:00 mass, with placement of a hydromark butterfly clip  2   Left breast 2-3 o'clock nodule is of low suspicion (4A)  Management of this will be deferred until results of the current core biopsy are available and until after surgical consultation has occurred    As discussed above, assuming that the patient does not pursue surgical intervention on the right breast, then findings in both breasts could be followed if clinically desired  Pathology Results: Pending Mammo Post Biopsy: Right Breast - May 14, 2018 - Check In #: [de-identified] CC and ML view(s) were taken of the right breast  Technologist: BERNA Guthrie (BERNA)(PATRICIO) Recommendation: Pathology pending  Transcription Location: [address] [place] [address]: [id number]    Mammo Diagnostic Bilateral W Cad    Result Date: 5/14/2018  Narrative: Patient History: Patient is postmenopausal  Reason for exam: clinical finding  Mammo Diagnostic Bilateral W CAD: May 14, 2018 - Check In #: [de-identified] Bilateral MLO and CC view(s) were taken  ML view(s) were taken of the right breast  Technologist: BERNA Guthrie (BERNA)(PATRICIO) No prior studies available for comparison  There are scattered fibroglandular densities  There is a palpable marker overlying the upper outer right breast with underlying 3 6 x 2 4 cm lobulated mass  There are prominent tubular structures in the retroareolar right breast   There is an 8 mm bilobed mass in the upper outer left breast middle 3rd in depth  There are scattered benign-appearing calcifications bilaterally  There are prominent left axillary lymph nodes  Targeted sonographic evaluation of the area of palpable concern right breast 10 o'clock position 7 cm from the nipple reveals a 3 3 x 2 x 2 cm hypoechoic mass with increased internal vascularity  This corresponds with the mammographic finding and has irregular margins  There is an ultrasound-guided sampling is recommended  Targeted sonographic evaluation of the retroareolar right breast demonstrates multiple ducts without intraductal filling defect  There are no enlarged suspicious right axillary lymph nodes  Targeted sonographic evaluation of the 2 to 3 o'clock position left breast 8 cm from the nipple are healed a 5 x 3 x 3 mm hypoechoic lesion with slightly irregular margins  This corresponds with the mammographic finding   On discussing findings with the patient the patient and her son stated they did not desire surgery  Therefore biopsy of the left-sided lesion is not warranted at this time  If decision on surgery changes potential biopsy of the left-sided lesion could be revisited at that time  US Breast Right Limited: May 14, 2018 - Check In #: [de-identified] Standard views  Technologist: Mason Milton RDMS A uniformly echogenic layer of fibroglandular tissue  US Breast Left Limited: May 14, 2018 - Check In #: [de-identified] Standard views  Technologist: Mason Milton RDMS ACR BI-RADS® Assessments: BiRad:5 - Highly suggestive of malignancy (Overall) Diag Mammogram: Right breast finding is BiRad:5 - highly suggestive of malignancy  Right breast Right Brst US: Left breast finding is BiRad:5 - highly suggestive of malignancy  Recommendation: Ultrasound-guided biopsy of the right breast  Analyzed by CAD The patient is scheduled in a reminder system for screening mammography  Transcription Location: [facility] 143: [id number] Risk Value(s): Myriad Table: 1 5%    Us Breast Left Limited (diagnostic)    Result Date: 5/22/2018  Narrative: Patient History: Patient is postmenopausal  Reason for exam: clinical finding  Mammo Diagnostic Bilateral W CAD: May 14, 2018 - Check In #: [de-identified] Bilateral MLO and CC view(s) were taken  ML view(s) were taken of the right breast  Technologist: BERNA German (R)(M) No prior studies available for comparison  There are scattered fibroglandular densities  There is a palpable marker overlying the upper outer right breast with underlying 3 6 x 2 4 cm lobulated mass  There are prominent tubular structures in the retroareolar right breast   There is an 8 mm bilobed mass in the upper outer left breast middle 3rd in depth  There are scattered benign-appearing calcifications bilaterally  There are prominent left axillary lymph nodes   Targeted sonographic evaluation of the area of palpable concern right breast 10 o'clock position 7 cm from the nipple reveals a 3 3 x 2 x 2 cm hypoechoic mass with increased internal vascularity  This corresponds with the mammographic finding and has irregular margins  There is an ultrasound-guided sampling is recommended  Targeted sonographic evaluation of the retroareolar right breast demonstrates multiple ducts without intraductal filling defect  There are no enlarged suspicious right axillary lymph nodes  Targeted sonographic evaluation of the 2 to 3 o'clock position left breast 8 cm from the nipple are healed a 5 x 3 x 3 mm hypoechoic lesion with slightly irregular margins  This corresponds with the mammographic finding  On discussing findings with the patient the patient and her son stated they did not desire surgery  Therefore biopsy of the left-sided lesion is not warranted at this time  If decision on surgery changes potential biopsy of the left-sided lesion could be revisited at that time  US Breast Right Limited: May 14, 2018 - Check In #: [de-identified] Standard views  Technologist: Lucetta Burkitt, RDMS A uniformly echogenic layer of fibroglandular tissue  US Breast Left Limited: May 14, 2018 - Check In #: [de-identified] Standard views  Technologist: Lucetta Burkitt, RDMS ACR BI-RADS® Assessments: BiRad:5 - Highly suggestive of malignancy (Overall) Diag Mammogram: Right breast finding is BiRad:5 - highly suggestive of malignancy  Right breast Right Brst US: Left breast finding is BiRad:5 - highly suggestive of malignancy  Recommendation: Ultrasound-guided biopsy of the right breast  Analyzed by CAD The patient is scheduled in a reminder system for screening mammography  Transcription Location: [facility] 143: [id number] Risk Value(s): Myriad Table: 1 5%    Us Breast Right Limited (diagnostic)    Result Date: 5/14/2018  Narrative: Patient History: Patient is postmenopausal  Reason for exam: clinical finding  Mammo Diagnostic Bilateral W CAD: May 14, 2018 - Check In #: [de-identified] Bilateral MLO and CC view(s) were taken    ML view(s) were taken of the right breast  Technologist: BERNA Soto T (R)(M) No prior studies available for comparison  There are scattered fibroglandular densities  There is a palpable marker overlying the upper outer right breast with underlying 3 6 x 2 4 cm lobulated mass  There are prominent tubular structures in the retroareolar right breast   There is an 8 mm bilobed mass in the upper outer left breast middle 3rd in depth  There are scattered benign-appearing calcifications bilaterally  There are prominent left axillary lymph nodes  Targeted sonographic evaluation of the area of palpable concern right breast 10 o'clock position 7 cm from the nipple reveals a 3 3 x 2 x 2 cm hypoechoic mass with increased internal vascularity  This corresponds with the mammographic finding and has irregular margins  There is an ultrasound-guided sampling is recommended  Targeted sonographic evaluation of the retroareolar right breast demonstrates multiple ducts without intraductal filling defect  There are no enlarged suspicious right axillary lymph nodes  Targeted sonographic evaluation of the 2 to 3 o'clock position left breast 8 cm from the nipple are healed a 5 x 3 x 3 mm hypoechoic lesion with slightly irregular margins  This corresponds with the mammographic finding  On discussing findings with the patient the patient and her son stated they did not desire surgery  Therefore biopsy of the left-sided lesion is not warranted at this time  If decision on surgery changes potential biopsy of the left-sided lesion could be revisited at that time  US Breast Right Limited: May 14, 2018 - Check In #: [de-identified] Standard views  Technologist: Richelle Qiu RDMS A uniformly echogenic layer of fibroglandular tissue  US Breast Left Limited: May 14, 2018 - Check In #: [de-identified] Standard views   Technologist: Richelle Qiu RDMS ACR BI-RADS® Assessments: BiRad:5 - Highly suggestive of malignancy (Overall) Diag Mammogram: Right breast finding is BiRad:5 - highly suggestive of malignancy  Right breast Right Brst US: Left breast finding is BiRad:5 - highly suggestive of malignancy  Recommendation: Ultrasound-guided biopsy of the right breast  Analyzed by CAD The patient is scheduled in a reminder system for screening mammography  Transcription Location: [facility] 143: [id number] Risk Value(s): [email or web address] Table: 1 5%        Labs:   Lab Results   Component Value Date    WBC 5 11 11/21/2016    HGB 7 8 (L) 11/21/2016    HCT 24 8 (L) 11/21/2016    MCV 92 11/21/2016     11/21/2016     Lab Results   Component Value Date     11/21/2016    K 4 1 11/21/2016     11/21/2016    CO2 29 11/21/2016    ANIONGAP 6 11/21/2016    BUN 48 (H) 11/21/2016    CREATININE 1 33 (H) 11/21/2016    GLUCOSE 111 11/21/2016    CALCIUM 8 4 11/21/2016    AST 25 11/20/2016    ALT 16 11/20/2016    ALKPHOS 151 (H) 11/20/2016    PROT 6 7 11/20/2016    BILITOT 0 35 11/20/2016    EGFR 36 9 11/21/2016         Lab Results   Component Value Date    IRON 50 11/14/2016    TIBC 150 (L) 11/14/2016    FERRITIN 87 11/14/2016           Vital Sign:    Body surface area is 1 55 meters squared      Wt Readings from Last 3 Encounters:   05/24/18 63 5 kg (140 lb)   05/23/18 63 5 kg (140 lb)   11/21/16 83 1 kg (183 lb 3 2 oz)        Temp Readings from Last 3 Encounters:   05/24/18 98 °F (36 7 °C) (Tympanic)   05/23/18 97 9 °F (36 6 °C)   12/06/16 98 5 °F (36 9 °C)        BP Readings from Last 3 Encounters:   05/24/18 130/64   05/23/18 130/70   05/14/18 110/60         Pulse Readings from Last 3 Encounters:   05/24/18 78   05/23/18 74   05/14/18 76     @LASTSAO2(3)@    Active Problems:   Patient Active Problem List   Diagnosis    Abdominal pain    Depression    Deep vein thrombosis (DVT) of lower extremity (HCC)    Ischemic bowel disease (HCC)    Protein calorie malnutrition (HCC)    Anemia    Septicemia ([address] )    Breast mass    Malignant neoplasm of upper-outer quadrant of right breast in female, estrogen receptor positive ([facility] [address] )       Past Medical History:   Past Medical History:   Diagnosis Date    Abdominal aortic aneurysm without rupture (HCC)     Anxiety     Atrial fibrillation (HCC)     Blood clot in vein     Breast mass     Cyst of pancreas     Depression     Depression     Difficulty walking     Diverticulitis     DVT (deep venous thrombosis) (HCC)     Dysphagia     Dysphagia, oropharyngeal phase     Frequent urination     Functional urinary incontinence     GERD (gastroesophageal reflux disease)     Hyperlipidemia     Hypertension     Long term (current) use of anticoagulants     Metabolic encephalopathy     Personal history of other malignant neoplasm of large intestine     Unspecified protein-calorie malnutrition (HCC)     Vascular disorder of intestine ([facility] [address] )     Wears glasses     Weight loss        Surgical History:   Past Surgical History:   Procedure Laterality Date    ABDOMINAL SURGERY      BREAST BIOPSY Right 05/14/2018    HYSTERECTOMY      IVC FILTER INSERTION  1990    KIDNEY STONE SURGERY      LAPAROTOMY N/A 11/1/2016    Procedure: EXPLORATORY LAPAROTOMY WITH EXTENSIVE LYSIS OF ADHESIONS, SEGMENTAL SMALL BOWEL RESECTION WITH PRIMARY ANASTAMOSIS, REPAIR OF MULTIPLE SMALL BOWEL ENTEROTOMIES;  Surgeon: Arslan Hogue MD;  Location: [facility] OR;  Service:     SPINE SURGERY         Family History:    Family History   Problem Relation Age of Onset    Family history unknown: Yes       Family history is unknown by patient  Social History:   Social History     Social History    Marital status:      Spouse name: N/A    Number of children: N/A    Years of education: N/A     Occupational History    Not on file       Social History Main Topics    Smoking status: Former Smoker    Smokeless tobacco: Never Used    Alcohol use Yes      Comment: special occasions    Drug use: No    Sexual activity: Not on file     Other Topics Concern    Not on file Social History Narrative    No narrative on file       Current Medications:   Current Outpatient Prescriptions   Medication Sig Dispense Refill    Calcium Carbonate-Vitamin D 600-200 MG-UNIT TABS Take by mouth      Escitalopram Oxalate (LEXAPRO PO) Take 5 mg by mouth      Multiple Vitamin (MULTIVITAMINS PO) Take by mouth      warfarin (COUMADIN) 1 mg tablet Take by mouth      polyethylene glycol (GLYCOLAX) powder Take 17 g by mouth daily for 30 days 510 g 0     No current facility-administered medications for this visit  Allergies:    Allergies   Allergen Reactions    Ofloxacin

## 2018-08-27 ENCOUNTER — OFFICE VISIT (OUTPATIENT)
Dept: SURGICAL ONCOLOGY | Facility: CLINIC | Age: 83
End: 2018-08-27
Payer: MEDICARE

## 2018-08-27 VITALS — HEART RATE: 95 BPM | SYSTOLIC BLOOD PRESSURE: 120 MMHG | DIASTOLIC BLOOD PRESSURE: 70 MMHG | TEMPERATURE: 95.8 F

## 2018-08-27 DIAGNOSIS — Z79.811 USE OF ANASTROZOLE (ARIMIDEX): ICD-10-CM

## 2018-08-27 DIAGNOSIS — C50.411 MALIGNANT NEOPLASM OF UPPER-OUTER QUADRANT OF RIGHT BREAST IN FEMALE, ESTROGEN RECEPTOR POSITIVE (HCC): Primary | ICD-10-CM

## 2018-08-27 DIAGNOSIS — Z17.0 MALIGNANT NEOPLASM OF UPPER-OUTER QUADRANT OF RIGHT BREAST IN FEMALE, ESTROGEN RECEPTOR POSITIVE (HCC): Primary | ICD-10-CM

## 2018-08-27 PROCEDURE — 99214 OFFICE O/P EST MOD 30 MIN: CPT | Performed by: SURGERY

## 2018-08-27 RX ORDER — ANASTROZOLE 1 MG/1
1 TABLET ORAL DAILY
COMMUNITY

## 2018-08-27 NOTE — PROGRESS NOTES
Surgical Oncology Follow Up       [address] [facility] [place]  [address] [address]    Harry Christine  11/19/1919  [id number]  [facility] SURGICAL ONCOLOG[facility] [place]  [address] [address]    Chief Complaint   Patient presents with    Breast Cancer     Pt is here for 3 month follow up        Assessment/Plan   Diagnoses and all orders for this visit:    Malignant neoplasm of upper-outer quadrant of right breast in female, estrogen receptor positive [facility])        Advance Care Planning/Advance Directives:  Did not discuss  with the patient  Oncology History:     No history exists  History of Present Illness: breast cancer follow up  -Interval History: none    Review of Systems:  Review of Systems   Constitutional: Negative  Negative for appetite change and fever  Eyes: Negative  Respiratory: Negative for shortness of breath  Cardiovascular: Negative  Gastrointestinal: Negative  Endocrine: Negative  Genitourinary: Negative  Musculoskeletal: Negative  Negative for arthralgias and myalgias  Skin: Negative  Allergic/Immunologic: Negative  Neurological: Negative  Hematological: Negative  Negative for adenopathy  Does not bruise/bleed easily  Psychiatric/Behavioral: Negative          Patient Active Problem List   Diagnosis    Abdominal pain    Depression    Deep vein thrombosis (DVT) of lower extremity (HCC)    Ischemic bowel disease ([address] )    Protein calorie malnutrition ([address] )    Anemia    Septicemia ([address] )    Malignant neoplasm of upper-outer quadrant of right breast in female, estrogen receptor positive ([address] )     Past Medical History:   Diagnosis Date    Abdominal aortic aneurysm without rupture ([address] )     Anxiety     Atrial fibrillation ([address] )     Blood clot in vein     Cyst of pancreas     Depression     Depression     Difficulty walking     Diverticulitis     DVT (deep venous thrombosis) (HCC)     Dysphagia     Dysphagia, oropharyngeal phase     Frequent urination     Functional urinary incontinence     GERD (gastroesophageal reflux disease)     Hyperlipidemia     Hypertension     Long term (current) use of anticoagulants     Metabolic encephalopathy     Personal history of other malignant neoplasm of large intestine     Unspecified protein-calorie malnutrition (HCC)     Vascular disorder of intestine ([address] )     Wears glasses     Weight loss      Past Surgical History:   Procedure Laterality Date    ABDOMINAL SURGERY      BREAST BIOPSY Right 05/14/2018    HYSTERECTOMY      IVC FILTER INSERTION  1990    KIDNEY STONE SURGERY      LAPAROTOMY N/A 11/1/2016    Procedure: EXPLORATORY LAPAROTOMY WITH EXTENSIVE LYSIS OF ADHESIONS, SEGMENTAL SMALL BOWEL RESECTION WITH PRIMARY ANASTAMOSIS, REPAIR OF MULTIPLE SMALL BOWEL ENTEROTOMIES;  Surgeon: Adeola Sanders MD;  Location: [facility] OR;  Service:     SPINE SURGERY       Family History   Problem Relation Age of Onset    Family history unknown: Yes     Social History     Social History    Marital status:      Spouse name: N/A    Number of children: N/A    Years of education: N/A     Occupational History    Not on file       Social History Main Topics    Smoking status: Former Smoker    Smokeless tobacco: Never Used    Alcohol use Yes      Comment: special occasions    Drug use: No    Sexual activity: Not on file     Other Topics Concern    Not on file     Social History Narrative    No narrative on file       Current Outpatient Prescriptions:     Calcium Carbonate-Vitamin D 600-200 MG-UNIT TABS, Take by mouth, Disp: , Rfl:     Escitalopram Oxalate (LEXAPRO PO), Take 5 mg by mouth, Disp: , Rfl:     Multiple Vitamin (MULTIVITAMINS PO), Take by mouth, Disp: , Rfl:     warfarin (COUMADIN) 1 mg tablet, Take by mouth, Disp: , Rfl:     polyethylene glycol (GLYCOLAX) powder, Take 17 g by mouth daily for 30 days, Disp: 510 g, Rfl: 0  Allergies   Allergen Reactions    Ofloxacin        The following portions of the patient's history were reviewed and updated as appropriate: allergies, current medications, past family history, past medical history, past social history, past surgical history and problem list         Vitals:    08/27/18 1414   BP: 120/70   Pulse: 95   Temp: (!) 95 8 °F (35 4 °C)       Physical Exam   Constitutional: She appears well-developed and well-nourished  HENT:   Head: Normocephalic and atraumatic  Cardiovascular: Normal heart sounds  Pulmonary/Chest: Breath sounds normal  Right breast exhibits mass  Right breast exhibits no inverted nipple, no nipple discharge, no skin change and no tenderness  Left breast exhibits no inverted nipple, no mass, no nipple discharge, no skin change and no tenderness  1 5-2cm by palpation   Lymphadenopathy:        Right axillary: No pectoral and no lateral adenopathy present  Left axillary: No pectoral and no lateral adenopathy present  Right: No supraclavicular adenopathy present  Left: No supraclavicular adenopathy present  Psychiatric: She has a normal mood and affect  Discussion/Summary:  80-year-old female with a right breast carcinoma in the upper outer quadrant  She declined surgery at the time of her consultation  She was referred to Medical Oncology and was started on anastrozole  On examination today her tumor has shrunk down considerably  This was 3 6 cm of presentation and measures roughly 2 cm by palpation today  She is scheduled to see Dr Harjinder Salvador in follow-up next month  I advised her to continue with medical management  She states that even if the medication were not working, that she will be turning 89 in the near term and would not opt for any additional management  I will therefore see her on an as needed basis

## 2018-09-20 ENCOUNTER — OFFICE VISIT (OUTPATIENT)
Dept: HEMATOLOGY ONCOLOGY | Facility: CLINIC | Age: 83
End: 2018-09-20
Payer: MEDICARE

## 2018-09-20 VITALS
WEIGHT: 145 LBS | BODY MASS INDEX: 31.28 KG/M2 | RESPIRATION RATE: 18 BRPM | OXYGEN SATURATION: 93 % | SYSTOLIC BLOOD PRESSURE: 142 MMHG | TEMPERATURE: 97.4 F | HEART RATE: 78 BPM | DIASTOLIC BLOOD PRESSURE: 70 MMHG | HEIGHT: 57 IN

## 2018-09-20 DIAGNOSIS — C50.411 MALIGNANT NEOPLASM OF UPPER-OUTER QUADRANT OF RIGHT BREAST IN FEMALE, ESTROGEN RECEPTOR POSITIVE (HCC): Primary | ICD-10-CM

## 2018-09-20 DIAGNOSIS — Z17.0 MALIGNANT NEOPLASM OF UPPER-OUTER QUADRANT OF RIGHT BREAST IN FEMALE, ESTROGEN RECEPTOR POSITIVE (HCC): Primary | ICD-10-CM

## 2018-09-20 PROCEDURE — 99214 OFFICE O/P EST MOD 30 MIN: CPT | Performed by: INTERNAL MEDICINE

## 2018-09-20 RX ORDER — LATANOPROST 50 UG/ML
1 SOLUTION/ DROPS OPHTHALMIC
COMMUNITY

## 2018-09-20 RX ORDER — ACETAMINOPHEN 325 MG/1
650 TABLET ORAL EVERY 4 HOURS
COMMUNITY

## 2018-09-20 RX ORDER — ANTIOX #8/OM3/DHA/EPA/LUT/ZEAX 250-2.5 MG
CAPSULE ORAL 2 TIMES DAILY
COMMUNITY

## 2018-09-20 RX ORDER — AMLODIPINE BESYLATE 5 MG/1
5 TABLET ORAL DAILY
COMMUNITY

## 2018-09-20 NOTE — PROGRESS NOTES
Hematology / Oncology Outpatient Follow Up Note    Ailyn Shin 80 y o  female :1919 AJITH:[id number]         Date:  2018    Assessment / Plan:  A 80year-old postmenopausal woman with clinical stage IIA right breast cancer, grade 2, % positive, % positive, HER2 negative disease  She does not wish to have surgical treatment for breast cancer  Since May 2018, she has been on hormonal therapy with anastrozole  Based on physical examination, she has clinical partial response  She has no toxicity from anastrozole  Therefore, I recommended her to continue with anastrozole 1 mg once a day  She is in agreement with my recommendation  I will see her again in 6 months for routine follow-up              Subjective:      HPI:  A 80year old postmenopausal woman who recently noticed a lump in the right breast which she brought to medical attention  Mammography and ultrasound showed approximately 3 cm of mass, located at 10 o'clock position from nipple in the right breast   She underwent right breast ultrasound-guided biopsy which showed invasive ductal carcinoma, grade 2  This was % positive, % positive, HER2 negative disease  She was seen by Dr Chino El yesterday  She does not wish to have surgical treatment for the breast cancer  Therefore, she presents today to discuss medical treatment  She has no breast related symptomatology  She denied any pain  She has no respiratory symptoms  She has baseline ambulatory dysfunction  She normally use a walker for ambulation  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  Her performance status is 2/4 on the ECOG scale            Interval History:  A 80year-old postmenopausal woman with clinical stage IIA right breast cancer, grade 2, % positive, % positive, HER2 negative disease  She does not wish to have surgical treatment for breast cancer    Since May 2018, she has been on hormonal therapy with anastrozole  She presents today for follow-up  She has no new complaint  She denied hot flashes or musculoskeletal symptoms  She has no complaint of pain  She is aware that her breast mass is shrinking  Her performance status is stable            Objective:      Primary Diagnosis:     Clinical stage IIA right breast cancer, grade 2, % positive, % positive, HER2 negative disease  Diagnosed in May 2018      Cancer Staging:  Cancer Staging  Malignant neoplasm of upper-outer quadrant of right breast in female, estrogen receptor positive ([address] )  Staging form: Breast, AJCC 8th Edition  - Clinical: Stage IB (cT2, cN0, cM0, G2, ER: Positive, MI: Positive, HER2: Negative) - Signed by Jan Alexander MD on 5/23/2018           Previous Hematologic/ Oncologic Treatment:            Current Hematologic/ Oncologic Treatment:       Hormonal therapy with anastrozole since May 2018      Disease Status:      Clinical partial response      Test Results:     Pathology:     Right breast biopsy showed invasive ductal carcinoma, grade 2  % positive, % positive, HER2 negative disease      Radiology:     Mammography and ultrasound showed a 3 3 times to use x 2 cm of right breast mass located at 10 o'clock position 7 cm from nipple in the right breast      Laboratory:           Physical Exam:        General Appearance:    Alert, oriented          Eyes:    PERRL   Ears:    Normal external ear canals, both ears   Nose:   Nares normal, septum midline   Throat:   Mucosa moist  Pharynx without injection  Neck:   Supple         Lungs:     Clear to auscultation bilaterally   Chest Wall:    No tenderness or deformity    Heart:    Regular rate and rhythm         Abdomen:     Soft, non-tender, bowel sounds +, no organomegaly               Extremities:   Extremities no cyanosis or edema         Skin:   no rash or icterus      Lymph nodes:   Cervical, supraclavicular, and axillary nodes normal Neurologic:   CNII-XII intact, normal strength, sensation and reflexes     Throughout             Breast exam:   2 x 2 cm of mass at outer upper quadrant of the right breast   Left breast exam is negative  ROS: Review of Systems        Imaging: No results found  Labs:   Lab Results   Component Value Date    WBC 5 11 11/21/2016    HGB 7 8 (L) 11/21/2016    HCT 24 8 (L) 11/21/2016    MCV 92 11/21/2016     11/21/2016     Lab Results   Component Value Date     11/21/2016    K 4 1 11/21/2016     11/21/2016    CO2 29 11/21/2016    ANIONGAP 7 11/30/2015    BUN 48 (H) 11/21/2016    CREATININE 1 33 (H) 11/21/2016    GLUCOSE 111 11/30/2015    CALCIUM 8 4 11/21/2016    AST 25 11/20/2016    ALT 16 11/20/2016    ALKPHOS 151 (H) 11/20/2016    PROT 7 0 02/17/2015    BILITOT 0 5 02/17/2015    EGFR 36 9 11/21/2016         Lab Results   Component Value Date    IRON 50 11/14/2016    TIBC 150 (L) 11/14/2016    FERRITIN 87 11/14/2016         Current Medications: Reviewed  Allergies: Reviewed  PMH/FH/SH:  Reviewed      Vital Sign:    Body surface area is 1 57 meters squared      Wt Readings from Last 3 Encounters:   09/20/18 65 8 kg (145 lb)   05/24/18 63 5 kg (140 lb)   05/23/18 63 5 kg (140 lb)        Temp Readings from Last 3 Encounters:   09/20/18 (!) 97 4 °F (36 3 °C) (Tympanic)   08/27/18 (!) 95 8 °F (35 4 °C)   05/24/18 98 °F (36 7 °C) (Tympanic)        BP Readings from Last 3 Encounters:   09/20/18 142/70   08/27/18 120/70   05/24/18 130/64         Pulse Readings from Last 3 Encounters:   09/20/18 78   08/27/18 95   05/24/18 78     @LASTSAO2(3)@

## 2018-10-29 ENCOUNTER — HOSPITAL ENCOUNTER (INPATIENT)
Facility: HOSPITAL | Age: 83
LOS: 1 days | Discharge: NURSING, REHAB OR LONG-TERM CARE | DRG: 388 | End: 2018-10-31
Attending: EMERGENCY MEDICINE | Admitting: INTERNAL MEDICINE
Payer: MEDICARE

## 2018-10-29 ENCOUNTER — APPOINTMENT (EMERGENCY)
Dept: RADIOLOGY | Facility: HOSPITAL | Age: 83
DRG: 388 | End: 2018-10-29
Payer: MEDICARE

## 2018-10-29 ENCOUNTER — APPOINTMENT (EMERGENCY)
Dept: CT IMAGING | Facility: HOSPITAL | Age: 83
DRG: 388 | End: 2018-10-29
Payer: MEDICARE

## 2018-10-29 DIAGNOSIS — R77.8 ELEVATED TROPONIN: ICD-10-CM

## 2018-10-29 DIAGNOSIS — N18.9 ACUTE-ON-CHRONIC KIDNEY INJURY (HCC): ICD-10-CM

## 2018-10-29 DIAGNOSIS — K56.600 SMALL BOWEL OBSTRUCTION, PARTIAL (HCC): ICD-10-CM

## 2018-10-29 DIAGNOSIS — N39.0 UTI (URINARY TRACT INFECTION): ICD-10-CM

## 2018-10-29 DIAGNOSIS — K56.600 PARTIAL SMALL BOWEL OBSTRUCTION (HCC): Primary | ICD-10-CM

## 2018-10-29 DIAGNOSIS — R79.1 SUPRATHERAPEUTIC INR: ICD-10-CM

## 2018-10-29 DIAGNOSIS — N17.9 ACUTE-ON-CHRONIC KIDNEY INJURY (HCC): ICD-10-CM

## 2018-10-29 LAB
BASOPHILS # BLD AUTO: 0.02 THOUSANDS/ΜL (ref 0–0.1)
BASOPHILS NFR BLD AUTO: 0 % (ref 0–1)
BILIRUB UR QL STRIP: ABNORMAL
CLARITY UR: ABNORMAL
COLOR UR: ABNORMAL
EOSINOPHIL # BLD AUTO: 0.01 THOUSAND/ΜL (ref 0–0.61)
EOSINOPHIL NFR BLD AUTO: 0 % (ref 0–6)
ERYTHROCYTE [DISTWIDTH] IN BLOOD BY AUTOMATED COUNT: 14.3 % (ref 11.6–15.1)
GLUCOSE UR STRIP-MCNC: NEGATIVE MG/DL
HCT VFR BLD AUTO: 41.9 % (ref 34.8–46.1)
HGB BLD-MCNC: 13.8 G/DL (ref 11.5–15.4)
HGB UR QL STRIP.AUTO: ABNORMAL
IMM GRANULOCYTES # BLD AUTO: 0.02 THOUSAND/UL (ref 0–0.2)
IMM GRANULOCYTES NFR BLD AUTO: 0 % (ref 0–2)
KETONES UR STRIP-MCNC: ABNORMAL MG/DL
LEUKOCYTE ESTERASE UR QL STRIP: NEGATIVE
LYMPHOCYTES # BLD AUTO: 1.46 THOUSANDS/ΜL (ref 0.6–4.47)
LYMPHOCYTES NFR BLD AUTO: 12 % (ref 14–44)
MCH RBC QN AUTO: 29.3 PG (ref 26.8–34.3)
MCHC RBC AUTO-ENTMCNC: 32.9 G/DL (ref 31.4–37.4)
MCV RBC AUTO: 89 FL (ref 82–98)
MONOCYTES # BLD AUTO: 0.73 THOUSAND/ΜL (ref 0.17–1.22)
MONOCYTES NFR BLD AUTO: 6 % (ref 4–12)
NEUTROPHILS # BLD AUTO: 9.49 THOUSANDS/ΜL (ref 1.85–7.62)
NEUTS SEG NFR BLD AUTO: 82 % (ref 43–75)
NITRITE UR QL STRIP: NEGATIVE
NRBC BLD AUTO-RTO: 0 /100 WBCS
PH UR STRIP.AUTO: 5.5 [PH] (ref 4.5–8)
PLATELET # BLD AUTO: 190 THOUSANDS/UL (ref 149–390)
PMV BLD AUTO: 10.1 FL (ref 8.9–12.7)
PROT UR STRIP-MCNC: ABNORMAL MG/DL
RBC # BLD AUTO: 4.71 MILLION/UL (ref 3.81–5.12)
SP GR UR STRIP.AUTO: >=1.03 (ref 1–1.03)
UROBILINOGEN UR QL STRIP.AUTO: 0.2 E.U./DL
WBC # BLD AUTO: 11.73 THOUSAND/UL (ref 4.31–10.16)

## 2018-10-29 PROCEDURE — 83690 ASSAY OF LIPASE: CPT | Performed by: EMERGENCY MEDICINE

## 2018-10-29 PROCEDURE — 85610 PROTHROMBIN TIME: CPT | Performed by: EMERGENCY MEDICINE

## 2018-10-29 PROCEDURE — 80053 COMPREHEN METABOLIC PANEL: CPT | Performed by: EMERGENCY MEDICINE

## 2018-10-29 PROCEDURE — 85730 THROMBOPLASTIN TIME PARTIAL: CPT | Performed by: EMERGENCY MEDICINE

## 2018-10-29 PROCEDURE — 81001 URINALYSIS AUTO W/SCOPE: CPT

## 2018-10-29 PROCEDURE — 85025 COMPLETE CBC W/AUTO DIFF WBC: CPT | Performed by: EMERGENCY MEDICINE

## 2018-10-29 PROCEDURE — 84484 ASSAY OF TROPONIN QUANT: CPT | Performed by: EMERGENCY MEDICINE

## 2018-10-29 PROCEDURE — 87086 URINE CULTURE/COLONY COUNT: CPT | Performed by: EMERGENCY MEDICINE

## 2018-10-29 PROCEDURE — 99285 EMERGENCY DEPT VISIT HI MDM: CPT

## 2018-10-29 PROCEDURE — 71045 X-RAY EXAM CHEST 1 VIEW: CPT

## 2018-10-29 PROCEDURE — 93005 ELECTROCARDIOGRAM TRACING: CPT

## 2018-10-29 PROCEDURE — 1123F ACP DISCUSS/DSCN MKR DOCD: CPT | Performed by: INTERNAL MEDICINE

## 2018-10-29 PROCEDURE — 36415 COLL VENOUS BLD VENIPUNCTURE: CPT | Performed by: EMERGENCY MEDICINE

## 2018-10-29 PROCEDURE — 83605 ASSAY OF LACTIC ACID: CPT | Performed by: EMERGENCY MEDICINE

## 2018-10-29 PROCEDURE — 87077 CULTURE AEROBIC IDENTIFY: CPT | Performed by: EMERGENCY MEDICINE

## 2018-10-29 PROCEDURE — 87186 SC STD MICRODIL/AGAR DIL: CPT | Performed by: EMERGENCY MEDICINE

## 2018-10-29 PROCEDURE — 74176 CT ABD & PELVIS W/O CONTRAST: CPT

## 2018-10-29 RX ORDER — ONDANSETRON 2 MG/ML
4 INJECTION INTRAMUSCULAR; INTRAVENOUS ONCE
Status: COMPLETED | OUTPATIENT
Start: 2018-10-29 | End: 2018-10-30

## 2018-10-29 RX ORDER — HYDROMORPHONE HCL/PF 1 MG/ML
0.5 SYRINGE (ML) INJECTION ONCE
Status: COMPLETED | OUTPATIENT
Start: 2018-10-29 | End: 2018-10-30

## 2018-10-29 RX ORDER — SODIUM CHLORIDE 9 MG/ML
125 INJECTION, SOLUTION INTRAVENOUS CONTINUOUS
Status: DISCONTINUED | OUTPATIENT
Start: 2018-10-29 | End: 2018-10-30 | Stop reason: HOSPADM

## 2018-10-30 PROBLEM — N17.9 AKI (ACUTE KIDNEY INJURY) (HCC): Status: ACTIVE | Noted: 2018-10-30

## 2018-10-30 PROBLEM — I10 HYPERTENSION: Status: ACTIVE | Noted: 2018-10-30

## 2018-10-30 PROBLEM — R77.8 ELEVATED TROPONIN: Status: ACTIVE | Noted: 2018-10-30

## 2018-10-30 LAB
ALBUMIN SERPL BCP-MCNC: 3.4 G/DL (ref 3.5–5)
ALP SERPL-CCNC: 86 U/L (ref 46–116)
ALT SERPL W P-5'-P-CCNC: 29 U/L (ref 12–78)
ANION GAP SERPL CALCULATED.3IONS-SCNC: 12 MMOL/L (ref 4–13)
APTT PPP: 79 SECONDS (ref 24–36)
AST SERPL W P-5'-P-CCNC: 37 U/L (ref 5–45)
ATRIAL RATE: 84 BPM
BACTERIA UR QL AUTO: ABNORMAL /HPF
BILIRUB SERPL-MCNC: 0.7 MG/DL (ref 0.2–1)
BUN SERPL-MCNC: 36 MG/DL (ref 5–25)
CALCIUM SERPL-MCNC: 9.5 MG/DL (ref 8.3–10.1)
CAOX CRY URNS QL MICRO: ABNORMAL /HPF
CHLORIDE SERPL-SCNC: 97 MMOL/L (ref 100–108)
CO2 SERPL-SCNC: 29 MMOL/L (ref 21–32)
CREAT SERPL-MCNC: 1.46 MG/DL (ref 0.6–1.3)
GFR SERPL CREATININE-BSD FRML MDRD: 30 ML/MIN/1.73SQ M
GLUCOSE SERPL-MCNC: 157 MG/DL (ref 65–140)
INR PPP: 4.94 (ref 0.86–1.17)
LACTATE SERPL-SCNC: 1.8 MMOL/L (ref 0.5–2)
LIPASE SERPL-CCNC: 39 U/L (ref 73–393)
NON-SQ EPI CELLS URNS QL MICRO: ABNORMAL /HPF
P AXIS: 111 DEGREES
POTASSIUM SERPL-SCNC: 4.5 MMOL/L (ref 3.5–5.3)
PR INTERVAL: 180 MS
PROT SERPL-MCNC: 7.5 G/DL (ref 6.4–8.2)
PROTHROMBIN TIME: 44.5 SECONDS (ref 11.8–14.2)
QRS AXIS: -56 DEGREES
QRSD INTERVAL: 100 MS
QT INTERVAL: 382 MS
QTC INTERVAL: 451 MS
RBC #/AREA URNS AUTO: ABNORMAL /HPF
SODIUM SERPL-SCNC: 138 MMOL/L (ref 136–145)
T WAVE AXIS: 54 DEGREES
TROPONIN I SERPL-MCNC: 0.09 NG/ML
TROPONIN I SERPL-MCNC: 0.1 NG/ML
TROPONIN I SERPL-MCNC: 0.14 NG/ML
VENTRICULAR RATE: 84 BPM
WBC #/AREA URNS AUTO: ABNORMAL /HPF

## 2018-10-30 PROCEDURE — 93010 ELECTROCARDIOGRAM REPORT: CPT | Performed by: INTERNAL MEDICINE

## 2018-10-30 PROCEDURE — 99223 1ST HOSP IP/OBS HIGH 75: CPT | Performed by: INTERNAL MEDICINE

## 2018-10-30 PROCEDURE — 96361 HYDRATE IV INFUSION ADD-ON: CPT

## 2018-10-30 PROCEDURE — 96360 HYDRATION IV INFUSION INIT: CPT

## 2018-10-30 PROCEDURE — 96375 TX/PRO/DX INJ NEW DRUG ADDON: CPT

## 2018-10-30 PROCEDURE — 36415 COLL VENOUS BLD VENIPUNCTURE: CPT | Performed by: PHYSICIAN ASSISTANT

## 2018-10-30 PROCEDURE — 87081 CULTURE SCREEN ONLY: CPT | Performed by: PHYSICIAN ASSISTANT

## 2018-10-30 PROCEDURE — 96374 THER/PROPH/DIAG INJ IV PUSH: CPT

## 2018-10-30 PROCEDURE — 84484 ASSAY OF TROPONIN QUANT: CPT | Performed by: PHYSICIAN ASSISTANT

## 2018-10-30 RX ORDER — AMLODIPINE BESYLATE 5 MG/1
5 TABLET ORAL DAILY
Status: DISCONTINUED | OUTPATIENT
Start: 2018-10-30 | End: 2018-10-31 | Stop reason: HOSPADM

## 2018-10-30 RX ORDER — ANASTROZOLE 1 MG/1
1 TABLET ORAL DAILY
Status: DISCONTINUED | OUTPATIENT
Start: 2018-10-30 | End: 2018-10-31 | Stop reason: HOSPADM

## 2018-10-30 RX ORDER — DOCUSATE SODIUM 100 MG/1
100 CAPSULE, LIQUID FILLED ORAL 2 TIMES DAILY
Status: DISCONTINUED | OUTPATIENT
Start: 2018-10-30 | End: 2018-10-31 | Stop reason: HOSPADM

## 2018-10-30 RX ORDER — BISACODYL 10 MG
SUPPOSITORY, RECTAL RECTAL DAILY PRN
COMMUNITY

## 2018-10-30 RX ORDER — ACETAMINOPHEN 325 MG/1
650 TABLET ORAL EVERY 6 HOURS PRN
Status: DISCONTINUED | OUTPATIENT
Start: 2018-10-30 | End: 2018-10-31 | Stop reason: HOSPADM

## 2018-10-30 RX ORDER — CALCIUM CARBONATE 500(1250)
1 TABLET ORAL 2 TIMES DAILY WITH MEALS
Status: DISCONTINUED | OUTPATIENT
Start: 2018-10-30 | End: 2018-10-31 | Stop reason: HOSPADM

## 2018-10-30 RX ORDER — SORBITOL SOLUTION 70 %
30 SOLUTION, ORAL MISCELLANEOUS DAILY PRN
COMMUNITY

## 2018-10-30 RX ORDER — PHENOL 1.4 %
600 AEROSOL, SPRAY (ML) MUCOUS MEMBRANE 2 TIMES DAILY WITH MEALS
COMMUNITY

## 2018-10-30 RX ORDER — ONDANSETRON 2 MG/ML
4 INJECTION INTRAMUSCULAR; INTRAVENOUS EVERY 6 HOURS PRN
Status: DISCONTINUED | OUTPATIENT
Start: 2018-10-30 | End: 2018-10-31 | Stop reason: HOSPADM

## 2018-10-30 RX ORDER — ACETAMINOPHEN 325 MG/1
650 TABLET ORAL EVERY 6 HOURS PRN
Status: DISCONTINUED | OUTPATIENT
Start: 2018-10-30 | End: 2018-10-30 | Stop reason: SDUPTHER

## 2018-10-30 RX ORDER — LATANOPROST 50 UG/ML
1 SOLUTION/ DROPS OPHTHALMIC
Status: DISCONTINUED | OUTPATIENT
Start: 2018-10-30 | End: 2018-10-31 | Stop reason: HOSPADM

## 2018-10-30 RX ORDER — MAG HYDROX/ALUMINUM HYD/SIMETH 400-400-40
1 SUSPENSION, ORAL (FINAL DOSE FORM) ORAL AS NEEDED
COMMUNITY

## 2018-10-30 RX ORDER — SODIUM CHLORIDE 9 MG/ML
75 INJECTION, SOLUTION INTRAVENOUS CONTINUOUS
Status: DISCONTINUED | OUTPATIENT
Start: 2018-10-30 | End: 2018-10-31

## 2018-10-30 RX ORDER — ONDANSETRON 4 MG/1
4 TABLET, FILM COATED ORAL EVERY 4 HOURS PRN
COMMUNITY

## 2018-10-30 RX ORDER — WARFARIN SODIUM 1 MG/1
0.5 TABLET ORAL DAILY
COMMUNITY
End: 2018-10-31 | Stop reason: HOSPADM

## 2018-10-30 RX ADMIN — ONDANSETRON 4 MG: 2 INJECTION INTRAMUSCULAR; INTRAVENOUS at 00:14

## 2018-10-30 RX ADMIN — ACETAMINOPHEN 650 MG: 325 TABLET, FILM COATED ORAL at 22:45

## 2018-10-30 RX ADMIN — HYDROMORPHONE HYDROCHLORIDE 0.5 MG: 1 INJECTION, SOLUTION INTRAMUSCULAR; INTRAVENOUS; SUBCUTANEOUS at 00:18

## 2018-10-30 RX ADMIN — SODIUM CHLORIDE 500 ML: 0.9 INJECTION, SOLUTION INTRAVENOUS at 00:20

## 2018-10-30 RX ADMIN — SODIUM CHLORIDE 75 ML/HR: 0.9 INJECTION, SOLUTION INTRAVENOUS at 06:38

## 2018-10-30 RX ADMIN — SODIUM CHLORIDE 125 ML/HR: 0.9 INJECTION, SOLUTION INTRAVENOUS at 02:10

## 2018-10-30 RX ADMIN — DOCUSATE SODIUM 100 MG: 100 CAPSULE, LIQUID FILLED ORAL at 19:43

## 2018-10-30 RX ADMIN — LATANOPROST 1 DROP: 50 SOLUTION OPHTHALMIC at 22:40

## 2018-10-30 RX ADMIN — IOHEXOL 50 ML: 240 INJECTION, SOLUTION INTRATHECAL; INTRAVASCULAR; INTRAVENOUS; ORAL at 00:21

## 2018-10-30 NOTE — ASSESSMENT & PLAN NOTE
· Presents with abdominal pain  · CTAP shows "Findings consistent with partial small bowel obstruction at the site of a pelvic anastomosis"  · Pt also presents with elevated troponin of 0 1, no prior troponins available   · INR supratherapeutic at 4 9 on arrival   · Lipase wnl, lactic acid wnl  · Will keep NPO  · Consult Surgery   · Serial abdominal exams  · If vomiting develops, would consider placing NG tube

## 2018-10-30 NOTE — H&P
H&P- Lexie Doctor 11/19/1919, 80 y o  female MRN: [id number]    Unit/Bed#: ED 12 Encounter: [id number]    Primary Care Provider: Mona Rivera MD   Date and time admitted to hospital: 10/29/2018 10:03 PM    * Small bowel obstruction, partial ([facility] )   Assessment & Plan    · Presents with abdominal pain  · CTAP shows "Findings consistent with partial small bowel obstruction at the site of a pelvic anastomosis"  · Pt also presents with elevated troponin of 0 1, no prior troponins available   · INR supratherapeutic at 4 9 on arrival   · Lipase wnl, lactic acid wnl  · Will keep NPO  · Consult Surgery   · Serial abdominal exams  · If vomiting develops, would consider placing NG tube      Elevated troponin   Assessment & Plan    · Suspect NSTEMI type II d/t dehydration  · No cardiac disease in records   · No c/o CP/SOB  · Will continue to trend, hold off on heparin gtt   · INR 4 9 on arrival   · Monitor on tele   · If persisting elevation, would consult Cardiology      CHRISTINA (acute kidney injury) ([facility] )   Assessment & Plan    · Cr 1 46 on arrival up from baseline of approx ?1-1 2  · Likely pre-renal d/t poor PO intake 2/2 PSBO  · Gentle IVF, repeat BMP      Hypertension   Assessment & Plan    · Controlled, continue amlodipine     Malignant neoplasm of upper-outer quadrant of right breast in female, estrogen receptor positive [facility])   Assessment & Plan    · Dx May 2018, Follows with Dr Abran Woods  · Pt declined surgery  · Has shown partial response to daily anastrazole on OP visit in Sep 2018  · Continue anastrazole      Deep vein thrombosis (DVT) of lower extremity ([facility] )   Assessment & Plan    · On coumadin  · Arrived with INR of 4 9  · Hold dose today and repeat INR in 24 hours       VTE Prophylaxis: hold  / sequential compression device   Code Status: DNR  POLST: POLST form is not discussed and not completed at this time    Discussion with family: none    Anticipated Length of Stay:  Patient will be admitted on an Observation basis with an anticipated length of stay of  < 2 midnights  Justification for Hospital Stay: per plan above    Total Time for Visit, including Counseling / Coordination of Care: 30 minutes  Greater than 50% of this total time spent on direct patient counseling and coordination of care  Chief Complaint:   Abdominal pain    History of Present Illness:    Zulema Alonso is a 80 y o  female with PMHx of DVT and HTN who presents with abdominal pain  She states that her pain began Thursday that progressed on Saturday  She gestures to the entire abdomen when asked location  She admits to severe nausea and dry heaves, spitting up some her juice at times  She says her pain began to improve yesterday and today she feels well  She reports her last BM on Thursday but does report that she has been passing gas this past weekend  She admits to anorexia as well and states she was placed on "plain liquids" the past two days at [place] although she does say she feels her PO fluid intake was adequate the past few days  Denies blood in stool or vomit  She denies fever or chills  Denies urinary symptoms  Denies chest pain or SOB and denies LE edema/warmth/tenderness  Denies cough or congestion  Denies headache or dizziness  Review of Systems:    Review of Systems   Constitutional: Positive for appetite change  HENT: Negative  Eyes: Negative  Respiratory: Negative  Cardiovascular: Negative  Gastrointestinal: Positive for abdominal pain, constipation, nausea and vomiting  Endocrine: Negative  Genitourinary: Negative  Musculoskeletal: Negative  Skin: Negative  Allergic/Immunologic: Negative  Neurological: Negative  Hematological: Negative  Psychiatric/Behavioral: Negative          Past Medical and Surgical History:     Past Medical History:   Diagnosis Date    Abdominal aortic aneurysm without rupture ([facility] )     Anxiety     [facility]     Blood clot in vein     Bowel obstruction ([facility] )     Cyst of pancreas     Depression     Depression     Difficulty walking     Diverticulitis     DVT (deep venous thrombosis) (HCC)     Dysphagia     Dysphagia, oropharyngeal phase     Frequent urination     Functional urinary incontinence     GERD (gastroesophageal reflux disease)     Hyperlipidemia     Hypertension     Long term (current) use of anticoagulants     Metabolic encephalopathy     Personal history of other malignant neoplasm of large intestine     Unspecified protein-calorie malnutrition ([facility] )     Vascular disorder of intestine ([facility] )     Wears glasses     Weight loss        Past Surgical History:   Procedure Laterality Date    ABDOMINAL SURGERY      BREAST BIOPSY Right 05/14/2018    HYSTERECTOMY      IVC FILTER INSERTION  1990    KIDNEY STONE SURGERY      LAPAROTOMY N/A 11/1/2016    Procedure: EXPLORATORY LAPAROTOMY WITH EXTENSIVE LYSIS OF ADHESIONS, SEGMENTAL SMALL BOWEL RESECTION WITH PRIMARY ANASTAMOSIS, REPAIR OF MULTIPLE SMALL BOWEL ENTEROTOMIES;  Surgeon: Keny Carmona MD;  Location: [facility];  Service:     SPINE SURGERY         Meds/Allergies:    Prior to Admission medications    Medication Sig Start Date End Date Taking?  Authorizing Provider   acetaminophen (TYLENOL) 325 mg tablet Take 650 mg by mouth every 4 (four) hours   Yes Historical Provider, MD   Artificial Saliva (BIOTENE MOISTURIZING MOUTH MT) Apply 2 sprays to the mouth or throat every 2 (two) hours as needed (for dry mouth)   Yes Historical Provider, MD   bisacodyl (DULCOLAX) 10 mg suppository Insert into the rectum daily as needed for constipation   Yes Historical Provider, MD   calcium carbonate (OS-IZZY) 600 MG tablet Take 600 mg by mouth 2 (two) times a day with meals   Yes Historical Provider, MD   Cholecalciferol (VITAMIN D3) 63206 units TABS Take by mouth   Yes Historical Provider, MD   glycerin adult 2 g suppository Insert 1 suppository into the rectum as needed for constipation Yes Historical Provider, MD   Mouthwashes (BIOTENE DRY MOUTH GENTLE) LIQD Apply to the mouth or throat   Yes Historical Provider, MD   ondansetron (ZOFRAN) 4 mg tablet Take 4 mg by mouth every 4 (four) hours as needed for nausea or vomiting   Yes Historical Provider, MD   sorbitol 70 % solution Take 30 mL by mouth daily as needed (for constipation)   Yes Historical Provider, MD   warfarin (COUMADIN) 1 mg tablet Take 0 5 mg by mouth daily   10/25/18 10/30/18 Yes Historical Provider, MD   warfarin (COUMADIN) 1 mg tablet Take 0 5 mg by mouth daily   Yes Historical Provider, MD   amLODIPine (NORVASC) 5 mg tablet Take 5 mg by mouth daily    Historical Provider, MD   anastrozole (ARIMIDEX) 1 mg tablet Take 1 mg by mouth daily    Historical Provider, MD   Calcium Carbonate-Vitamin D 600-200 MG-UNIT TABS Take by mouth    Historical Provider, MD   latanoprost (XALATAN) 0 005 % ophthalmic solution Administer 1 drop to both eyes daily at bedtime    Historical Provider, MD   Multiple Vitamin (MULTIVITAMINS PO) Take by mouth    Historical Provider, MD   Multiple Vitamins-Minerals (PRESERVISION AREDS 2) CAPS Take by mouth 2 (two) times a day    Historical Provider, MD   polyethylene glycol (GLYCOLAX) powder Take 17 g by mouth daily for 30 days 11/21/16 12/21/16  Jaye Flowers MD     I have reveiwed home medications using records provided by [facility]  Allergies: Allergies   Allergen Reactions    Ofloxacin        Social History:     Marital Status:     Occupation: retired  Patient Pre-hospital Living Situation: [address] [place] [address]  Patient Pre-hospital Level of Mobility: not discussed  Patient Pre-hospital Diet Restrictions: none  Substance Use History:   History   Alcohol Use    Yes     Comment: special occasions     History   Smoking Status    Former Smoker   Smokeless Tobacco    Never Used     History   Drug Use No       Family History:    non-contributory    Physical Exam:     Vitals:   Blood Pressure: 129/60 (10/30/18 0602)  Pulse: 67 (10/30/18 0602)  Temperature: 98 4 °F (36 9 °C) (10/29/18 2210)  Temp Source: Oral (10/29/18 2210)  Respirations: 18 (10/30/18 0602)  Height: 4' 9" (144 8 cm) (10/29/18 2210)  Weight - Scale: 66 9 kg (147 lb 7 8 oz) (10/29/18 2210)  SpO2: 96 % (10/30/18 0602)    Physical Exam   Constitutional: She appears well-developed and well-nourished  No distress  Resting comfortably   HENT:   Head: Normocephalic  Dry MM   Cardiovascular: Normal rate, regular rhythm, normal heart sounds and intact distal pulses  Exam reveals no gallop and no friction rub  No murmur heard  Pulmonary/Chest: Effort normal and breath sounds normal  No respiratory distress  She has no wheezes  She has no rales  She exhibits no tenderness  Abdominal: Soft  Bowel sounds are normal  She exhibits no distension and no mass  There is no tenderness  There is no rebound and no guarding  Musculoskeletal: She exhibits no edema or tenderness  Neurological: She is alert  Skin: Skin is warm and dry  No rash noted  She is not diaphoretic  No erythema  No pallor  Psychiatric: She has a normal mood and affect  Her behavior is normal    Nursing note and vitals reviewed  Additional Data:     Lab Results: I have personally reviewed pertinent reports          Results from last 7 days  Lab Units 10/29/18  2346   WBC Thousand/uL 11 73*   HEMOGLOBIN g/dL 13 8   HEMATOCRIT % 41 9   PLATELETS Thousands/uL 190   NEUTROS ABS Thousands/µL 9 49*   NEUTROS PCT % 82*   LYMPHS PCT % 12*   MONOS PCT % 6   EOS PCT % 0       Results from last 7 days  Lab Units 10/29/18  2346   SODIUM mmol/L 138   POTASSIUM mmol/L 4 5   CHLORIDE mmol/L 97*   CO2 mmol/L 29   BUN mg/dL 36*   CREATININE mg/dL 1 46*   ANION GAP mmol/L 12   CALCIUM mg/dL 9 5   ALBUMIN g/dL 3 4*   TOTAL BILIRUBIN mg/dL 0 70   ALK PHOS U/L 86   ALT U/L 29   AST U/L 37       Results from last 7 days  Lab Units 10/29/18  2346   INR  4 94*               Results from last 7 days  Lab Units 10/29/18  2346   LACTIC ACID mmol/L 1 8       Imaging: I have personally reviewed pertinent reports  CT abdomen pelvis wo contrast   ED Interpretation by Maile Phoenix, MD (10/30 0316)   FINDINGS:      ABDOMEN      LOWER CHEST:  Large hiatal hernia seen  Rajinder Guadarrama is scarring/atelectatic change at the right lung base  LIVER/BILIARY TREE:  Unremarkable  GALLBLADDER:  No calcified gallstones  No pericholecystic inflammatory change  SPLEEN:  Unremarkable  PANCREAS:  Stable pancreatic cysts   The largest at the pancreatic tail measures 3 9 cm and is stable in size from CT examination of 11/12/2016  ADRENAL GLANDS:  Stable left adrenal nodule measuring 2 4 cm  KIDNEYS/URETERS:  Left renal atrophy   Simple right renal cyst   No hydronephrosis  STOMACH AND BOWEL:  Numerous small bowel resections are seen, for instance at the left upper quadrant and at the pelvis   There is prominent small bowel dilatation at the the pelvic anastomosis up to 7 1 cm with loops of relatively collapsed bowel more    distally, some of which contain minimal amounts of oral contrast   Presumed transition point is for instance seen on coronal image 601, image 48   Finding is consistent with partial small bowel obstruction  Rajinder Guadarrama is a rectal fecal impaction  APPENDIX:  No findings to suggest appendicitis  ABDOMINOPELVIC CAVITY:  No ascites or free intraperitoneal air  No lymphadenopathy  VESSELS:  Atherosclerotic changes are present   No evidence of aneurysm  IVC filter is in place      PELVIS      REPRODUCTIVE ORGANS:  Patient is status post hysterectomy  URINARY BLADDER:  Unremarkable  ABDOMINAL WALL/INGUINAL REGIONS:  Unremarkable  OSSEOUS STRUCTURES:  Stable degenerative changes throughout the lumbar spine   Stable compression deformity at T8 and L4   Grade 1 anterolisthesis of L5 on S1 secondary to degenerative pars defects at L5 level     Impression:        Findings consistent with partial small bowel obstruction at the site of a pelvic anastomosis   Serial abdominal plain film radiographs are recommended to ensure passage of contrast      Findings discussed with Dr Megan Werner at 3:12 AM, 10/30/2018               Workstation performed: [id number]         Final Result by Dakota Nair MD (10/30 2934)      Findings consistent with partial small bowel obstruction at the site of a pelvic anastomosis  Serial abdominal plain film radiographs are recommended to ensure passage of contrast      Findings discussed with Dr Megan Werner at 3:12 AM, 10/30/2018               Workstation performed: [id number]         XR chest 1 view portable   ED Interpretation by Aries Viera MD (10/29 6833)   Elevated R hemidiaphragm, RLL atx read by me  EKG, Pathology, and Other Studies Reviewed on Admission:   · EKG: Sinus rhythm, 84 BPM    Allscripts / Epic Records Reviewed: Yes     ** Please Note: This note has been constructed using a voice recognition system   **

## 2018-10-30 NOTE — ASSESSMENT & PLAN NOTE
· Cr 1 46 on arrival up from baseline of approx ?1-1 2  · Likely pre-renal d/t poor PO intake 2/2 PSBO  · Gentle IVF, repeat BMP

## 2018-10-30 NOTE — ASSESSMENT & PLAN NOTE
· Dx May 2018, Follows with Dr Oseas Moreno  · Pt declined surgery  · Has shown partial response to daily anastrazole on OP visit in Sep 2018  · Continue anastrazole

## 2018-10-30 NOTE — PROGRESS NOTES
Patient examined  CT scan reviewed  See consult note  Clinically feeling much better  Denies any further abdominal pain  She is hungry  Abdomen mildly round, soft, nontender    Plan:  Clear liquids, toast, crackers      Patient as well as family stated that they would not want to proceed with any type of surgical intervention if warranted  It seems that conservative measures seemed to be working

## 2018-10-30 NOTE — ED PROVIDER NOTES
History  Chief Complaint   Patient presents with    Abdominal Pain     Pt arrives via EMS from [facility] for generalized abdominal pain for the past 3 days  Pt c/o naseua with vomiting that started last night  Pt has hx of bowel obstruction  Per EMS doctor at [facility] set up a direct admit but something went wrong so they were told to bring pt to ER  Patient is a 80year old female with 2-3 days of constant worsening diffuse abdominal pain with nausea and vomiting  No diarrhea  (+) flatus  No fever  No urinary sx  No GI bleeding  Has had prior hysterectomy and surgery for CYNTHIA and was last seen at [facility] ED on 11/1/16 for ischemic bowel disease  [facility] website checked on this patient and no Rx found  History provided by:  Patient, EMS personnel and nursing home   used: No    Abdominal Pain   Associated symptoms: nausea and vomiting    Associated symptoms: no diarrhea and no fever        Prior to Admission Medications   Prescriptions Last Dose Informant Patient Reported? Taking?    Artificial Saliva (BIOTENE MOISTURIZING MOUTH MT)   Yes Yes   Sig: Apply 2 sprays to the mouth or throat every 2 (two) hours as needed (for dry mouth)   Calcium Carbonate-Vitamin D 600-200 MG-UNIT TABS  Family Member Yes No   Sig: Take by mouth   Cholecalciferol (VITAMIN D3) 80856 units TABS 10/11/2018  Yes Yes   Sig: Take by mouth   Mouthwashes (BIOTENE DRY MOUTH GENTLE) LIQD   Yes Yes   Sig: Apply to the mouth or throat   Multiple Vitamin (MULTIVITAMINS PO)  Family Member Yes No   Sig: Take by mouth   Multiple Vitamins-Minerals (PRESERVISION AREDS 2) CAPS 10/29/2018 at 1700  Yes No   Sig: Take by mouth 2 (two) times a day   acetaminophen (TYLENOL) 325 mg tablet Past Week at 1127  Yes Yes   Sig: Take 650 mg by mouth every 4 (four) hours   amLODIPine (NORVASC) 5 mg tablet 10/29/2018 at 0900  Yes No   Sig: Take 5 mg by mouth daily   anastrozole (ARIMIDEX) 1 mg tablet 10/29/2018 at 0900  Yes No   Sig: Take 1 mg by mouth daily   bisacodyl (DULCOLAX) 10 mg suppository   Yes Yes   Sig: Insert into the rectum daily as needed for constipation   calcium carbonate (OS-IZZY) 600 MG tablet 10/29/2018 at 1700  Yes Yes   Sig: Take 600 mg by mouth 2 (two) times a day with meals   glycerin adult 2 g suppository   Yes Yes   Sig: Insert 1 suppository into the rectum as needed for constipation   latanoprost (XALATAN) 0 005 % ophthalmic solution 10/28/2018  Yes No   Sig: Administer 1 drop to both eyes daily at bedtime   ondansetron (ZOFRAN) 4 mg tablet 10/29/2018 at 0125  Yes Yes   Sig: Take 4 mg by mouth every 4 (four) hours as needed for nausea or vomiting   polyethylene glycol (GLYCOLAX) powder   No No   Sig: Take 17 g by mouth daily for 30 days   sorbitol 70 % solution   Yes Yes   Sig: Take 30 mL by mouth daily as needed (for constipation)   warfarin (COUMADIN) 1 mg tablet 10/28/2018 at Unknown time Family Member Yes Yes   Sig: Take 0 5 mg by mouth daily     warfarin (COUMADIN) 1 mg tablet   Yes Yes   Sig: Take 0 5 mg by mouth daily      Facility-Administered Medications: None       Past Medical History:   Diagnosis Date    Abdominal aortic aneurysm without rupture (HCC)     Anxiety     Atrial fibrillation ([address] )     Blood clot in vein     Bowel obstruction (HCC)     Cyst of pancreas     Depression     Depression     Difficulty walking     Diverticulitis     DVT (deep venous thrombosis) (HCC)     Dysphagia     Dysphagia, oropharyngeal phase     Frequent urination     Functional urinary incontinence     GERD (gastroesophageal reflux disease)     Hyperlipidemia     Hypertension     Long term (current) use of anticoagulants     Metabolic encephalopathy     Personal history of other malignant neoplasm of large intestine     Unspecified protein-calorie malnutrition ([address] )     Vascular disorder of intestine ([address] )     Wears glasses     Weight loss        Past Surgical History:   Procedure Laterality Date    ABDOMINAL SURGERY      BREAST BIOPSY Right 05/14/2018    HYSTERECTOMY      IVC FILTER INSERTION  1990    KIDNEY STONE SURGERY      LAPAROTOMY N/A 11/1/2016    Procedure: EXPLORATORY LAPAROTOMY WITH EXTENSIVE LYSIS OF ADHESIONS, SEGMENTAL SMALL BOWEL RESECTION WITH PRIMARY ANASTAMOSIS, REPAIR OF MULTIPLE SMALL BOWEL ENTEROTOMIES;  Surgeon: Marlene Berry MD;  Location: [facility];  Service:     SPINE SURGERY         Family History   Problem Relation Age of Onset    Family history unknown: Yes     I have reviewed and agree with the history as documented  Social History   Substance Use Topics    Smoking status: Former Smoker    Smokeless tobacco: Never Used    Alcohol use Yes      Comment: special occasions        Review of Systems   Constitutional: Negative for fever  Gastrointestinal: Positive for abdominal pain, nausea and vomiting  Negative for blood in stool and diarrhea  Genitourinary: Negative for difficulty urinating  All other systems reviewed and are negative  Physical Exam  Physical Exam   Constitutional: She is oriented to person, place, and time  She appears well-developed and well-nourished  She appears distressed (moderate)  HENT:   Head: Normocephalic and atraumatic  Dry mucous membranes  Eyes: No scleral icterus  Neck: No tracheal deviation present  Cardiovascular: Normal rate, regular rhythm and normal heart sounds  No murmur heard  Pulmonary/Chest: Effort normal and breath sounds normal  No stridor  No respiratory distress  She has no wheezes  She has no rales  Abdominal: Soft  She exhibits distension  There is tenderness (diffuse)  There is no rebound and no guarding  Musculoskeletal: She exhibits no edema or deformity  Neurological: She is alert and oriented to person, place, and time  Skin: Skin is warm and dry  No rash noted  Psychiatric: She has a normal mood and affect  Nursing note and vitals reviewed        Vital Signs  ED Triage Vitals [10/29/18 2210] Temperature Pulse Respirations Blood Pressure SpO2   98 4 °F (36 9 °C) 84 20 142/63 94 %      Temp Source Heart Rate Source Patient Position - Orthostatic VS BP Location FiO2 (%)   Oral Monitor Lying Right arm --      Pain Score       8           Vitals:    10/30/18 0145 10/30/18 0200 10/30/18 0215 10/30/18 0331   BP:  162/70  126/69   Pulse: 80 80 78 80   Patient Position - Orthostatic VS:  Lying  Lying       Visual Acuity      ED Medications  Medications   sodium chloride 0 9 % infusion (125 mL/hr Intravenous New Bag 10/30/18 0210)   sodium chloride 0 9 % bolus 500 mL (0 mL Intravenous Stopped 10/30/18 0210)   ondansetron (ZOFRAN) injection 4 mg (4 mg Intravenous Given 10/30/18 0014)   HYDROmorphone (DILAUDID) injection 0 5 mg (0 5 mg Intravenous Given 10/30/18 0018)   iohexol (OMNIPAQUE) 240 MG/ML solution 50 mL (50 mL Oral Given 10/30/18 0021)       Diagnostic Studies  Results Reviewed     Procedure Component Value Units Date/Time    Urine culture [[id number]] Collected:  10/29/18 2343    Lab Status: In process Specimen:  Urine from Urine, Other Updated:  10/30/18 0014    Lactic acid, plasma [[id number]]  (Normal) Collected:  10/29/18 2346    Lab Status:  Final result Specimen:  Blood from Arm, Left Updated:  10/30/18 0013     LACTIC ACID 1 8 mmol/L     Narrative:         Result may be elevated if tourniquet was used during collection      Comprehensive metabolic panel [[id number]]  (Abnormal) Collected:  10/29/18 2346    Lab Status:  Final result Specimen:  Blood from Arm, Left Updated:  10/30/18 0013     Sodium 138 mmol/L      Potassium 4 5 mmol/L      Chloride 97 (L) mmol/L      CO2 29 mmol/L      ANION GAP 12 mmol/L      BUN 36 (H) mg/dL      Creatinine 1 46 (H) mg/dL      Glucose 157 (H) mg/dL      Calcium 9 5 mg/dL      AST 37 U/L      ALT 29 U/L      Alkaline Phosphatase 86 U/L      Total Protein 7 5 g/dL      Albumin 3 4 (L) g/dL      Total Bilirubin 0 70 mg/dL      eGFR 30 ml/min/1 73sq m     Narrative: National Kidney Disease Education Program recommendations are as follows:  GFR calculation is accurate only with a steady state creatinine  Chronic Kidney disease less than 60 ml/min/1 73 sq  meters  Kidney failure less than 15 ml/min/1 73 sq  meters      Troponin I [[id number]]  (Abnormal) Collected:  10/29/18 2346    Lab Status:  Final result Specimen:  Blood from Arm, Left Updated:  10/30/18 0012     Troponin I 0 10 (H) ng/mL     Lipase [[id number]]  (Abnormal) Collected:  10/29/18 2346    Lab Status:  Final result Specimen:  Blood from Arm, Left Updated:  10/30/18 0009     Lipase 39 (L) u/L     Protime-INR [[id number]]  (Abnormal) Collected:  10/29/18 2346    Lab Status:  Final result Specimen:  Blood from Arm, Left Updated:  10/30/18 0004     Protime 44 5 (H) seconds      INR 4 94 (H)    APTT [[id number]]  (Abnormal) Collected:  10/29/18 2346    Lab Status:  Final result Specimen:  Blood from Arm, Left Updated:  10/30/18 0004     PTT 79 (H) seconds     Urine Microscopic [[id number]]  (Abnormal) Collected:  10/29/18 2358    Lab Status:  Final result Specimen:  Urine from Urine, Clean Catch Updated:  10/30/18 0004     RBC, UA 2-4 (A) /hpf      WBC, UA 2-4 (A) /hpf      Epithelial Cells Occasional /hpf      Bacteria, UA Innumerable (A) /hpf      Ca Oxalate Nehal, UA Occasional (A) /hpf     CBC and differential [[id number]]  (Abnormal) Collected:  10/29/18 2346    Lab Status:  Final result Specimen:  Blood from Arm, Left Updated:  10/29/18 2353     WBC 11 73 (H) Thousand/uL      RBC 4 71 Million/uL      Hemoglobin 13 8 g/dL      Hematocrit 41 9 %      MCV 89 fL      MCH 29 3 pg      MCHC 32 9 g/dL      RDW 14 3 %      MPV 10 1 fL      Platelets 365 Thousands/uL      nRBC 0 /100 WBCs      Neutrophils Relative 82 (H) %      Immat GRANS % 0 %      Lymphocytes Relative 12 (L) %      Monocytes Relative 6 %      Eosinophils Relative 0 %      Basophils Relative 0 %      Neutrophils Absolute 9 49 (H) Thousands/µL      Immature Grans Absolute 0 02 Thousand/uL      Lymphocytes Absolute 1 46 Thousands/µL      Monocytes Absolute 0 73 Thousand/µL      Eosinophils Absolute 0 01 Thousand/µL      Basophils Absolute 0 02 Thousands/µL     ED Urine Macroscopic [[id number]]  (Abnormal) Collected:  10/29/18 2358    Lab Status:  Final result Specimen:  Urine Updated:  10/29/18 2344     Color, UA Mindy     Clarity, UA Cloudy     pH, UA 5 5     Leukocytes, UA Negative     Nitrite, UA Negative     Protein,  (2+) (A) mg/dl      Glucose, UA Negative mg/dl      Ketones, UA 15 (1+) (A) mg/dl      Urobilinogen, UA 0 2 E U /dl      Bilirubin, UA Interference- unable to analyze (A)     Blood, UA Small (A)     Specific Gravity, UA >=1 030    Narrative:       CLINITEK RESULT                 CT abdomen pelvis wo contrast   ED Interpretation by Ranjan Arteaga MD (10/30 0316)   FINDINGS:      ABDOMEN      LOWER CHEST:  Large hiatal hernia seen  Iman Oconnell is scarring/atelectatic change at the right lung base  LIVER/BILIARY TREE:  Unremarkable  GALLBLADDER:  No calcified gallstones  No pericholecystic inflammatory change  SPLEEN:  Unremarkable  PANCREAS:  Stable pancreatic cysts   The largest at the pancreatic tail measures 3 9 cm and is stable in size from CT examination of 11/12/2016  ADRENAL GLANDS:  Stable left adrenal nodule measuring 2 4 cm  KIDNEYS/URETERS:  Left renal atrophy   Simple right renal cyst   No hydronephrosis  STOMACH AND BOWEL:  Numerous small bowel resections are seen, for instance at the left upper quadrant and at the pelvis   There is prominent small bowel dilatation at the the pelvic anastomosis up to 7 1 cm with loops of relatively collapsed bowel more    distally, some of which contain minimal amounts of oral contrast   Presumed transition point is for instance seen on coronal image 601, image 48   Finding is consistent with partial small bowel obstruction  Iman Oconnell is a rectal fecal impaction  APPENDIX:  No findings to suggest appendicitis  ABDOMINOPELVIC CAVITY:  No ascites or free intraperitoneal air  No lymphadenopathy  VESSELS:  Atherosclerotic changes are present   No evidence of aneurysm  IVC filter is in place      PELVIS      REPRODUCTIVE ORGANS:  Patient is status post hysterectomy  URINARY BLADDER:  Unremarkable  ABDOMINAL WALL/INGUINAL REGIONS:  Unremarkable  OSSEOUS STRUCTURES:  Stable degenerative changes throughout the lumbar spine   Stable compression deformity at T8 and L4   Grade 1 anterolisthesis of L5 on S1 secondary to degenerative pars defects at L5 level  Impression:        Findings consistent with partial small bowel obstruction at the site of a pelvic anastomosis   Serial abdominal plain film radiographs are recommended to ensure passage of contrast      Findings discussed with Dr Mary Nayak at 3:12 AM, 10/30/2018               Workstation performed: [id number]         Final Result by Madhavi Franco MD (10/30 1345)      Findings consistent with partial small bowel obstruction at the site of a pelvic anastomosis  Serial abdominal plain film radiographs are recommended to ensure passage of contrast      Findings discussed with Dr Mary Nayak at 3:12 AM, 10/30/2018               Workstation performed: [id number]         XR chest 1 view portable   ED Interpretation by Angelica Aguillon MD (10/29 2295)   Elevated R hemidiaphragm, RLL atx read by me  Procedures  ECG 12 Lead Documentation  Date/Time: 10/29/2018 10:36 PM  Performed by: Antonia Pate  Authorized by: Antonia Pate     Indications / Diagnosis:  Abdominal pain  ECG reviewed by me, the ED Provider: yes    Patient location:  ED  Previous ECG:     Previous ECG:  Compared to current    Comparison ECG info:  11/8/16    Similarity:  Changes noted (LVH   Q wave in V1-2)  Quality:     Tracing quality:  Limited by artifact  Rate:     ECG rate:  84    ECG rate assessment: normal Rhythm:     Rhythm: sinus rhythm    Ectopy:     Ectopy: none    QRS:     QRS axis:  Left  Conduction:     Conduction: abnormal      Abnormal conduction: LAFB    ST segments:     ST segments:  Normal  T waves:     T waves: normal    Q waves:     Q waves:  V1 and V2  Other findings:     Other findings: LVH             Phone Contacts  ED Phone Contact    ED Course  ED Course as of Oct 30 0438   Tue Oct 30, 2018   8708 Patient feeling better and labs d/w patient      0321 CT d/w patient  Will hold off on NGT and ASA at this time since patient is not vomiting and has a supratherapeutic INR  Patient does not want to be transferred to [facility]  HEART Risk Score      Most Recent Value   History  0 Filed at: 10/30/2018 0205   ECG  0 Filed at: 10/30/2018 0205   Age  2 Filed at: 10/30/2018 0205   Risk Factors  1 Filed at: 10/30/2018 0205   Troponin  1 Filed at: 10/30/2018 0205   Heart Score Risk Calculator   History  0 Filed at: 10/30/2018 0205   ECG  0 Filed at: 10/30/2018 0205   Age  2 Filed at: 10/30/2018 0205   Risk Factors  1 Filed at: 10/30/2018 0205   Troponin  1 Filed at: 10/30/2018 0205   HEART Score  4 Filed at: 10/30/2018 0205   HEART Score  4 Filed at: 10/30/2018 0205                            MDM  Number of Diagnoses or Management Options  Diagnosis management comments: DDx including but not limited to: appendicitis, gastroenteritis, gastritis, PUD, GERD, gastroparesis, hepatitis, pancreatitis, colitis, enteritis, diverticulitis, food poisoning, mesenteric adenitis, mesenteric ischemia, IBD, IBS, ileus, bowel obstruction, volvulus, internal hernia, AAA, cholecystitis, biliary colic, choledocholithiasis, perforated viscus, splenic etiology, constipation, pelvic pathology, renal colic, pyelonephritis, UTI; doubt cardiac etiology          Amount and/or Complexity of Data Reviewed  Clinical lab tests: ordered and reviewed  Tests in the radiology section of CPT®: ordered and reviewed  Decide to obtain previous medical records or to obtain history from someone other than the patient: yes  Obtain history from someone other than the patient: yes  Review and summarize past medical records: yes  Independent visualization of images, tracings, or specimens: yes      CritCare Time    Disposition  Final diagnoses:   Partial small bowel obstruction (HCC)   [facility]ch[facility] ([facility] )   Elevated troponin   Supratherapeutic INR     Time reflects when diagnosis was documented in both MDM as applicable and the Disposition within this note     Time User Action Codes Description Comment    10/30/2018  4:36 AM Sow Shires Add [K56 600] Partial small bowel obstruction ([facility] )     10/30/2018  4:36 AM Sow Shires Add [N17 9,  N18 9] Acute-on-chronic kidney injury ([facility] )     10/30/2018  4:37 AM Sow Shires Add [R74 8] Elevated troponin     10/30/2018  4:37 AM Sow Shires Add [R79 1] Supratherapeutic INR       ED Disposition     ED Disposition Condition Comment    Admit  Case was discussed with KENDY Patel and the patient's admission status was agreed to be Admission Status: observation status to the service of Dr Dyan Serra   Follow-up Information    None         Patient's Medications   Discharge Prescriptions    No medications on file     No discharge procedures on file      ED Provider  Electronically Signed by           Sienna Hooks MD  10/30/18 1223

## 2018-10-30 NOTE — CONSULTS
Consult - General Surgery   Geovani Isabel 80 y o  female MRN: [id number]  Unit/Bed#: ED 12 Encounter: [id number]    Assessment/Plan     Assessment:  1  Geovani Isabel is a 80 y o  female with history of multiple abdominal surgeries and bowel resections presenting with abdominal pain and associated nausea/vomiting  CT showed a partial small bowel obstruction      Plan:  - No acute surgical intervention at this time  Patient hemodynamically stable and symptomatically improved since admission with complete resolution of abdominal pain and nausea  Now passing flatus   -Will continue NPO/IVF at this time  Consider advancement of diet to clear liquids later today given resolution of abdominal pain  - Pain control PRN  - Anticoagulation withheld at this time as patient presented with supratherapeutic INR  - SLIM primary     History of Present Illness     HPI:  Geovani Isabel is a 80 y o  female who presents with a two day history of abdominal pain with associated nausea and emesis  She described the pain as diffuse and that it progressively worsened over the weekend  She also noted severe nausea and dry-heaving, occupationally spitting up her juice  She was placed on a clear liquid diet at her assisted living facility which she did tolerate  Her last bowel movement was last Thursday  She has a medical history of multiple blood clots for which she takes coumadin  Surgical history includes a total hysterectomy and "multiple bowel surgeries " Per patient, her most recent surgery was in 2016 which she underwent an exploratory laparotomy with small bowel resection for bowel ischemia  She denies any complications since until now  Currently she states her abdominal pain has completely resolved since arriving at the hospital  She did note one episode of nausea without emesis when she was required to drink the PO contrast for imaging  She has had no other episodes of nausea   She denies any fevers, chills, chest pain, shortness of breath, weakness or dizziness  She has no other complaints  Review of Systems   Constitutional: Negative for activity change, appetite change, fatigue, fever and unexpected weight change  HENT: Negative for congestion, tinnitus and voice change  Eyes: Negative for photophobia, discharge and visual disturbance  Respiratory: Negative for apnea, chest tightness, shortness of breath, wheezing and stridor  Cardiovascular: Negative for chest pain and leg swelling  Gastrointestinal: Negative for abdominal distention, abdominal pain, constipation, nausea and rectal pain  Prior abdominal pain, nausea, and emesis now resolved   Endocrine: Negative for cold intolerance, polydipsia, polyphagia and polyuria  Genitourinary: Negative for decreased urine volume, difficulty urinating, dysuria, flank pain, hematuria, pelvic pain and urgency  Musculoskeletal: Negative for back pain, neck pain and neck stiffness  Skin: Negative for color change, pallor, rash and wound  Neurological: Positive for tremors  Negative for dizziness, syncope and weakness         Historical Information   Past Medical History:   Diagnosis Date    Abdominal aortic aneurysm without rupture (HCC)     Anxiety     Atrial fibrillation (HCC)     Blood clot in vein     Bowel obstruction (HCC)     Cyst of pancreas     Depression     Depression     Difficulty walking     Diverticulitis     DVT (deep venous thrombosis) (HCC)     Dysphagia     Dysphagia, oropharyngeal phase     Frequent urination     Functional urinary incontinence     GERD (gastroesophageal reflux disease)     Hyperlipidemia     Hypertension     Long term (current) use of anticoagulants     Metabolic encephalopathy     Personal history of other malignant neoplasm of large intestine     Unspecified protein-calorie malnutrition ([address] )     Vascular disorder of intestine ([address] )     Wears glasses     Weight loss      Past Surgical History:   Procedure Laterality Date    ABDOMINAL SURGERY      BREAST BIOPSY Right 05/14/2018    HYSTERECTOMY      IVC FILTER INSERTION  1990    KIDNEY STONE SURGERY      LAPAROTOMY N/A 11/1/2016    Procedure: EXPLORATORY LAPAROTOMY WITH EXTENSIVE LYSIS OF ADHESIONS, SEGMENTAL SMALL BOWEL RESECTION WITH PRIMARY ANASTAMOSIS, REPAIR OF MULTIPLE SMALL BOWEL ENTEROTOMIES;  Surgeon: Agustin May MD;  Location: [facility];  Service:     SPINE SURGERY       Social History   History   Alcohol Use    Yes     Comment: special occasions     History   Drug Use No     History   Smoking Status    Former Smoker   Smokeless Tobacco    Never Used     Family History:   Family History   Problem Relation Age of Onset    Family history unknown: Yes       Meds/Allergies   PTA meds:   Prior to Admission Medications   Prescriptions Last Dose Informant Patient Reported? Taking?    Artificial Saliva (BIOTENE MOISTURIZING MOUTH MT)   Yes Yes   Sig: Apply 2 sprays to the mouth or throat every 2 (two) hours as needed (for dry mouth)   Calcium Carbonate-Vitamin D 600-200 MG-UNIT TABS  Family Member Yes No   Sig: Take by mouth   Cholecalciferol (VITAMIN D3) 65733 units TABS 10/11/2018  Yes Yes   Sig: Take by mouth   Mouthwashes (BIOTENE DRY MOUTH GENTLE) LIQD   Yes Yes   Sig: Apply to the mouth or throat   Multiple Vitamin (MULTIVITAMINS PO)  Family Member Yes No   Sig: Take by mouth   Multiple Vitamins-Minerals (PRESERVISION AREDS 2) CAPS 10/29/2018 at 1700  Yes No   Sig: Take by mouth 2 (two) times a day   acetaminophen (TYLENOL) 325 mg tablet Past Week at 1127  Yes Yes   Sig: Take 650 mg by mouth every 4 (four) hours   amLODIPine (NORVASC) 5 mg tablet 10/29/2018 at 0900  Yes No   Sig: Take 5 mg by mouth daily   anastrozole (ARIMIDEX) 1 mg tablet 10/29/2018 at 0900  Yes No   Sig: Take 1 mg by mouth daily   bisacodyl (DULCOLAX) 10 mg suppository   Yes Yes   Sig: Insert into the rectum daily as needed for constipation   calcium carbonate (OS-IZZY) 600 MG tablet 10/29/2018 at 1700  Yes Yes   Sig: Take 600 mg by mouth 2 (two) times a day with meals   glycerin adult 2 g suppository   Yes Yes   Sig: Insert 1 suppository into the rectum as needed for constipation   latanoprost (XALATAN) 0 005 % ophthalmic solution 10/28/2018  Yes No   Sig: Administer 1 drop to both eyes daily at bedtime   ondansetron (ZOFRAN) 4 mg tablet 10/29/2018 at 0125  Yes Yes   Sig: Take 4 mg by mouth every 4 (four) hours as needed for nausea or vomiting   polyethylene glycol (GLYCOLAX) powder   No No   Sig: Take 17 g by mouth daily for 30 days   sorbitol 70 % solution   Yes Yes   Sig: Take 30 mL by mouth daily as needed (for constipation)   warfarin (COUMADIN) 1 mg tablet 10/28/2018 at Unknown time Family Member Yes Yes   Sig: Take 0 5 mg by mouth daily     warfarin (COUMADIN) 1 mg tablet   Yes Yes   Sig: Take 0 5 mg by mouth daily      Facility-Administered Medications: None     Allergies   Allergen Reactions    Ofloxacin        Objective   First Vitals:   Blood Pressure: 142/63 (10/29/18 2210)  Pulse: 84 (10/29/18 2210)  Temperature: 98 4 °F (36 9 °C) (10/29/18 2210)  Temp Source: Oral (10/29/18 2210)  Respirations: 20 (10/29/18 2210)  Height: 4' 9" (144 8 cm) (10/29/18 2210)  Weight - Scale: 66 9 kg (147 lb 7 8 oz) (10/29/18 2210)  SpO2: 94 % (10/29/18 2210)    Current Vitals:   Blood Pressure: 120/59 (10/30/18 0700)  Pulse: 68 (10/30/18 0700)  Temperature: 98 4 °F (36 9 °C) (10/29/18 2210)  Temp Source: Oral (10/29/18 2210)  Respirations: 18 (10/30/18 0700)  Height: 4' 9" (144 8 cm) (10/29/18 2210)  Weight - Scale: 66 9 kg (147 lb 7 8 oz) (10/29/18 2210)  SpO2: 97 % (10/30/18 0700)      Intake/Output Summary (Last 24 hours) at 10/30/18 0741  Last data filed at 10/30/18 0637   Gross per 24 hour   Intake              600 ml   Output              200 ml   Net              400 ml       Invasive Devices     Peripheral Intravenous Line            Peripheral IV 10/30/18 Left Arm less than 1 day Physical Exam   Constitutional: She is oriented to person, place, and time  She appears well-developed and well-nourished  No distress  HENT:   Head: Normocephalic and atraumatic  Right Ear: External ear normal    Left Ear: External ear normal    Mouth/Throat: Oropharynx is clear and moist  No oropharyngeal exudate  Eyes: Pupils are equal, round, and reactive to light  Conjunctivae and EOM are normal    Neck: Normal range of motion  Neck supple  No tracheal deviation present  No thyromegaly present  Cardiovascular: Normal rate, regular rhythm, normal heart sounds and intact distal pulses  Exam reveals no gallop and no friction rub  No murmur heard  Pulmonary/Chest: Effort normal and breath sounds normal  No respiratory distress  She has no wheezes  She has no rales  She exhibits no tenderness  Abdominal: Soft  Bowel sounds are normal  She exhibits no distension  There is no tenderness  There is no rebound and no guarding  Active bowel sounds, non-tender to palpation, midline scar healed, no tympany to percussion   Musculoskeletal: Normal range of motion  She exhibits no edema, tenderness or deformity  Neurological: She is oriented to person, place, and time  She has normal reflexes  No cranial nerve deficit  Coordination normal    Skin: Skin is warm and dry  No rash noted  She is not diaphoretic  No erythema  No pallor  Lab Results: I have personally reviewed pertinent lab results      Recent Results (from the past 36 hour(s))   CBC and differential    Collection Time: 10/29/18 11:46 PM   Result Value Ref Range    WBC 11 73 (H) 4 31 - 10 16 Thousand/uL    RBC 4 71 3 81 - 5 12 Million/uL    Hemoglobin 13 8 11 5 - 15 4 g/dL    Hematocrit 41 9 34 8 - 46 1 %    MCV 89 82 - 98 fL    MCH 29 3 26 8 - 34 3 pg    MCHC 32 9 31 4 - 37 4 g/dL    RDW 14 3 11 6 - 15 1 %    MPV 10 1 8 9 - 12 7 fL    Platelets 358 056 - 475 Thousands/uL    nRBC 0 /100 WBCs    Neutrophils Relative 82 (H) 43 - 75 % Immat GRANS % 0 0 - 2 %    Lymphocytes Relative 12 (L) 14 - 44 %    Monocytes Relative 6 4 - 12 %    Eosinophils Relative 0 0 - 6 %    Basophils Relative 0 0 - 1 %    Neutrophils Absolute 9 49 (H) 1 85 - 7 62 Thousands/µL    Immature Grans Absolute 0 02 0 00 - 0 20 Thousand/uL    Lymphocytes Absolute 1 46 0 60 - 4 47 Thousands/µL    Monocytes Absolute 0 73 0 17 - 1 22 Thousand/µL    Eosinophils Absolute 0 01 0 00 - 0 61 Thousand/µL    Basophils Absolute 0 02 0 00 - 0 10 Thousands/µL   Comprehensive metabolic panel    Collection Time: 10/29/18 11:46 PM   Result Value Ref Range    Sodium 138 136 - 145 mmol/L    Potassium 4 5 3 5 - 5 3 mmol/L    Chloride 97 (L) 100 - 108 mmol/L    CO2 29 21 - 32 mmol/L    ANION GAP 12 4 - 13 mmol/L    BUN 36 (H) 5 - 25 mg/dL    Creatinine 1 46 (H) 0 60 - 1 30 mg/dL    Glucose 157 (H) 65 - 140 mg/dL    Calcium 9 5 8 3 - 10 1 mg/dL    AST 37 5 - 45 U/L    ALT 29 12 - 78 U/L    Alkaline Phosphatase 86 46 - 116 U/L    Total Protein 7 5 6 4 - 8 2 g/dL    Albumin 3 4 (L) 3 5 - 5 0 g/dL    Total Bilirubin 0 70 0 20 - 1 00 mg/dL    eGFR 30 ml/min/1 73sq m   Lipase    Collection Time: 10/29/18 11:46 PM   Result Value Ref Range    Lipase 39 (L) 73 - 393 u/L   Lactic acid, plasma    Collection Time: 10/29/18 11:46 PM   Result Value Ref Range    LACTIC ACID 1 8 0 5 - 2 0 mmol/L   Protime-INR    Collection Time: 10/29/18 11:46 PM   Result Value Ref Range    Protime 44 5 (H) 11 8 - 14 2 seconds    INR 4 94 (H) 0 86 - 1 17   APTT    Collection Time: 10/29/18 11:46 PM   Result Value Ref Range    PTT 79 (H) 24 - 36 seconds   Troponin I    Collection Time: 10/29/18 11:46 PM   Result Value Ref Range    Troponin I 0 10 (H) <=0 04 ng/mL   ED Urine Macroscopic    Collection Time: 10/29/18 11:58 PM   Result Value Ref Range    Color, UA Mindy     Clarity, UA Cloudy     pH, UA 5 5 4 5 - 8 0    Leukocytes, UA Negative Negative    Nitrite, UA Negative Negative    Protein,  (2+) (A) Negative mg/dl Glucose, UA Negative Negative mg/dl    Ketones, UA 15 (1+) (A) Negative mg/dl    Urobilinogen, UA 0 2 0 2, 1 0 E U /dl E U /dl    Bilirubin, UA Interference- unable to analyze (A) Negative    Blood, UA Small (A) Negative    Specific Gravity, UA >=1 030 1 003 - 1 030   Urine Microscopic    Collection Time: 10/29/18 11:58 PM   Result Value Ref Range    RBC, UA 2-4 (A) None Seen, 0-5 /hpf    WBC, UA 2-4 (A) None Seen, 0-5, 5-55, 5-65 /hpf    Epithelial Cells Occasional None Seen, Occasional /hpf    Bacteria, UA Innumerable (A) None Seen, Occasional /hpf    Ca Oxalate Nehal, UA Occasional (A) None Seen /hpf   Troponin I    Collection Time: 10/30/18  6:27 AM   Result Value Ref Range    Troponin I 0 14 (H) <=0 04 ng/mL     Imaging: I have personally reviewed pertinent reports  Ct Abdomen Pelvis Wo Contrast    Result Date: 10/30/2018  Impression: Findings consistent with partial small bowel obstruction at the site of a pelvic anastomosis  Serial abdominal plain film radiographs are recommended to ensure passage of contrast Findings discussed with Dr León Hartman at 3:12 AM, 10/30/2018 Workstation performed: [id number]     EKG, Pathology, and Other Studies: I have personally reviewed pertinent reports

## 2018-10-30 NOTE — ED NOTES
Spoke on phone with pt daughter in-law Ida Mcgovern  Daughter in-law spoke to pt on the phone and I gave her an update on the pt        David Hernandez RN  10/30/18 [id number]

## 2018-10-30 NOTE — ASSESSMENT & PLAN NOTE
· Suspect NSTEMI type II d/t dehydration  · No cardiac disease in records   · No c/o CP/SOB  · Will continue to trend, hold off on heparin gtt   · INR 4 9 on arrival   · Monitor on tele   · If persisting elevation, would consult Cardiology

## 2018-10-30 NOTE — UTILIZATION REVIEW
Thank you,  [address]  Utilization Review Department  Phone: [phone]; Fax [phone]  ATTENTION: Please call with any questions or concerns to [phone]  and carefully follow the prompts so that you are directed to the right person  Send all requests for admission clinical reviews, approved or denied determinations and any other requests to fax [phone]  All voicemails are confidential    Initial Clinical Review    Admission: Date/Time/Statement: OBSERVATION ADMISSION 10/30/18 0438 converted to [address] 10/30/2018 1631 DUE TO  INADEQUATE ORAL INTAKE WITH SLOW ADVANCING DIET DUE TO PARTIAL BOWEL OBSTRUCTION  10/30/18 1632  Inpatient Admission Once     Transfer Service: General Medicine       Question Answer Comment   Admitting Physician Muna Stephens    Level of Care Med Surg    Estimated length of stay More than 2 Midnights    Certification I certify that inpatient services are medically necessary for this patient for a duration of greater than two midnights  See H&P and MD Progress Notes for additional information about the patient's course of treatment  10/30/18 1631       ED: Date/Time/Mode of Arrival:   ED Arrival Information     Expected Arrival Acuity Means of Arrival Escorted By Service Admission Type    - 10/29/2018 22:03 Urgent Ambulance [facility] Hospitalist Urgent    Arrival Complaint    -          Chief Complaint:   Chief Complaint   Patient presents with    Abdominal Pain     Pt arrives via EMS from [facility] for generalized abdominal pain for the past 3 days  Pt c/o naseua with vomiting that started last night  Pt has hx of bowel obstruction  Per EMS doctor at [facility] set up a direct admit but something went wrong so they were told to bring pt to ER  History of Illness: 80 y o  female with PMHx of DVT and HTN who presents with abdominal pain  She states that her pain began Thursday that progressed on Saturday    She gestures to the entire abdomen when asked location  She admits to severe nausea and dry heaves, spitting up some her juice at times  She says her pain began to improve yesterday and today she feels well  She reports her last BM on Thursday but does report that she has been passing gas this past weekend  She admits to anorexia as well and states she was placed on "plain liquids" the past two days at [facility] although she does say she feels her PO fluid intake was adequate the past few days    ED Vital Signs:   ED Triage Vitals [10/29/18 2210]   Temperature Pulse Respirations Blood Pressure SpO2   98 4 °F (36 9 °C) 84 20 142/63 94 %      Temp Source Heart Rate Source Patient Position - Orthostatic VS BP Location FiO2 (%)   Oral Monitor Lying Right arm --      Pain Score       8        Wt Readings from Last 1 Encounters:   10/29/18 66 9 kg (147 lb 7 8 oz)       Vital Signs (abnormal): NONE    Abnormal Labs/Diagnostic Test Results:   Chloride 97     BUN 36     Creatinine 1 46     Glucose 157     Albumin 3 4     Lipase 39, trop 0 1, wbc 11 73, Protime 44 5, INR 4 94, PTT 79, Urinalysis 1= ketones, small blood, 2+ protein, 2-4 RBC, 2-4 WBC,   EKG: Normal sinus rhythm  Pulmonary disease pattern  Left anterior fascicular block  Voltage criteria for left ventricular hypertrophy  Cannot rule out Septal infarct , age undetermined    10/30/2018 trop 0 14,   CT abdomen & pelvis:Findings consistent with partial small bowel obstruction at the site of a pelvic anastomosis  CHEST XR: Bibasilar subsegmental atelectasis        ED Treatment:   Medication Administration from 10/29/2018 2203 to 10/30/2018 4842       Date/Time Order Dose Route Action Comments     10/30/2018 0210 sodium chloride 0 9 % bolus 500 mL 0 mL Intravenous Stopped      10/30/2018 0020 sodium chloride 0 9 % bolus 500 mL 500 mL Intravenous New Bag      10/30/2018 0637 sodium chloride 0 9 % infusion 0 mL/hr Intravenous Stopped      10/30/2018 0210 sodium chloride 0 9 % infusion 125 mL/hr Intravenous New Bag      10/30/2018 0014 ondansetron (ZOFRAN) injection 4 mg 4 mg Intravenous Given      10/30/2018 0018 HYDROmorphone (DILAUDID) injection 0 5 mg 0 5 mg Intravenous Given      10/30/2018 0638 sodium chloride 0 9 % infusion 75 mL/hr Intravenous New Bag           Past Medical/Surgical History: Active Ambulatory Problems     Diagnosis Date Noted    Small bowel obstruction, partial ([facility] ) 11/01/2016    Depression 11/01/2016    Deep vein thrombosis (DVT) of lower extremity (HCC) 11/01/2016    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    Protein calorie malnutrition ([facility] ) 11/07/2016    Anemia 11/07/2016    Septicemia ([facility] ) 11/14/2016    Malignant neoplasm of upper-outer quadrant of right breast in female, estrogen receptor positive ([facility] ) 05/23/2018    Use of anastrozole (Arimidex) 08/27/2018       Past Medical History:   Diagnosis Date    Abdominal aortic aneurysm without rupture (HCC)     Anxiety     Atrial fibrillation (HCC)     Blood clot in vein     Bowel obstruction (HCC)     Cyst of pancreas     Depression     Depression     Difficulty walking     Diverticulitis     DVT (deep venous thrombosis) (HCC)     Dysphagia     Dysphagia, oropharyngeal phase     Frequent urination     Functional urinary incontinence     GERD (gastroesophageal reflux disease)     Hyperlipidemia     Hypertension     Long term (current) use of anticoagulants     Metabolic encephalopathy     Personal history of other malignant neoplasm of large intestine     Unspecified protein-calorie malnutrition ([facility] )     Vascular disorder of intestine ([facility] )     Wears glasses     Weight loss        Admitting Diagnosis: Abdominal pain [R10 9]    Age/Sex: 80 y o  female    Assessment/Plan: 10/30/2018   Small bowel obstruction, partial (HCC)   Assessment & Plan     · Presents with abdominal pain  ?  CTAP shows "Findings consistent with partial small bowel obstruction at the site of a pelvic anastomosis"  ? Pt also presents with elevated troponin of 0 1, no prior troponins available   § INR supratherapeutic at 4 9 on arrival   ? Lipase wnl, lactic acid wnl  · Will keep NPO  · Consult Surgery   · Serial abdominal exams  · If vomiting develops, would consider placing NG tube       Elevated troponin   Assessment & Plan     · Suspect NSTEMI type II d/t dehydration  ? No cardiac disease in records   ? No c/o CP/SOB  · Will continue to trend, hold off on heparin gtt   ? INR 4 9 on arrival   · Monitor on tele   · If persisting elevation, would consult Cardiology       CHRISTINA (acute kidney injury) ([facility] )   Assessment & Plan     · Cr 1 46 on arrival up from baseline of approx ?1-1 2  ? Likely pre-renal d/t poor PO intake 2/2 PSBO  · Gentle IVF, repeat BMP       Hypertension   Assessment & Plan     · Controlled, continue amlodipine      Malignant neoplasm of upper-outer quadrant of right breast in female, estrogen receptor positive [facility])   Assessment & Plan     · Dx May 2018, Follows with Dr Aicha Tom  · Pt declined surgery  · Has shown partial response to daily anastrazole on OP visit in Sep 2018  · Continue anastrazole       Deep vein thrombosis (DVT) of lower extremity (HCC)   Assessment & Plan     · On coumadin  · Arrived with INR of 4 9  ? Hold dose today and repeat INR in 24 hours          Admission Orders:  Scheduled Meds:   Current Facility-Administered Medications:  acetaminophen 650 mg Oral Q6H PRN    amLODIPine 5 mg Oral Daily    anastrozole 1 mg Oral Daily    calcium carbonate 1 tablet Oral BID With Meals    latanoprost 1 drop Both Eyes HS    ondansetron 4 mg Intravenous Q6H PRN    sodium chloride 75 mL/hr Intravenous Continuous Last Rate: 75 mL/hr (10/30/18 8446)     Continuous Infusions:   sodium chloride 75 mL/hr Last Rate: 75 mL/hr (10/30/18 8692)     PRN Meds:   Peripheral IV  48 hr telemetry  Straight cath  Trop 1  Up w assist  NPO    10/30/2018 surgery note  Assessment:  1   Nery Arzola is a 80 y o  female with history of multiple abdominal surgeries and bowel resections presenting with abdominal pain and associated nausea/vomiting  CT showed a partial small bowel obstruction    Plan:  - No acute surgical intervention at this time  Patient hemodynamically stable and symptomatically improved since admission with complete resolution of abdominal pain and nausea  Now passing flatus   -Will attempt clear liquid diet and advance as tolerated   Informed patient to be judicious with intake and to withhold on oral intake if she develops subsequent nausea or vomiting   - Pain control PRN  - Anticoagulation withheld at this time as patient presented with supratherapeutic INR  - SLIM primary

## 2018-10-30 NOTE — PHYSICIAN ADVISOR
Current patient class: Inpatient  The patient is currently on Hospital Day: 2 at [facility]      The patient was admitted to the hospital at [phone] on 10/30/18 for the following diagnosis:  Abdominal pain [R10 9]  Elevated troponin [R74 8]  Partial small bowel obstruction ([address] ) [K56 600]  Supratherapeutic INR [R79 1]  Acute-on-chronic kidney injury ([address] ) [N17 9, N18 9]       There is documentation in the medical record of an expected length of stay of at least 2 midnights  The patient is therefore expected to satisfy the 2 midnight benchmark and given the 2 midnight presumption is appropriate for INPATIENT ADMISSION  Given this expectation of a satisfying stay, CMS instructs us that the patient is most often appropriate for inpatient admission under part A provided medical necessity is documented in the chart  After review of the relevant documentation, labs, vital signs and test results, the patient is appropriate for INPATIENT ADMISSION  Admission to the hospital as an inpatient is a complex decision making process which requires the practitioner to consider the patients presenting complaint, history and physical examination and all relevant testing  With this in mind, in this case, the patient was deemed appropriate for INPATIENT ADMISSION  After review of the documentation and testing available at the time of the admission I concur with this clinical determination of medical necessity  Rationale is as follows: The patient is a 80 yrs old Female who presented to the ED at 10/29/2018 10:03 PM with a chief complaint of Abdominal Pain (Pt arrives via EMS from [facility] for generalized abdominal pain for the past 3 days  Pt c/o naseua with vomiting that started last night  Pt has hx of bowel obstruction   Per EMS doctor at [facility] set up a direct admit but something went wrong so they were told to bring pt to ER  )     The patient presents on this admission secondary to abdominal pain   The patient admitted to severe nausea and dry heaves  The patient is being admitted with partial small-bowel obstruction and elevated troponin  Plan of care includes NPO, surgery consultation, serial abdominal exams, serial cardiac enzymes, telemetry monitoring  Patient will be started on IV fluids  This patient is appropriate for inpatient admission as her length of stay is expected to be least 2 midnights  Continued hospitalization is necessary to ensure stabilization of her clinical status in this patient of advanced age and comorbidities      The patients vitals on arrival were ED Triage Vitals [10/29/18 2210]   Temperature Pulse Respirations Blood Pressure SpO2   98 4 °F (36 9 °C) 84 20 142/63 94 %      Temp Source Heart Rate Source Patient Position - Orthostatic VS BP Location FiO2 (%)   Oral Monitor Lying Right arm --      Pain Score       8           Past Medical History:   Diagnosis Date    Abdominal aortic aneurysm without rupture (HCC)     Anxiety     Atrial fibrillation (HCC)     Blood clot in vein     Bowel obstruction (HCC)     Cyst of pancreas     Depression     Depression     Difficulty walking     Diverticulitis     DVT (deep venous thrombosis) (HCC)     Dysphagia     Dysphagia, oropharyngeal phase     Frequent urination     Functional urinary incontinence     GERD (gastroesophageal reflux disease)     Hyperlipidemia     Hypertension     Long term (current) use of anticoagulants     Metabolic encephalopathy     Personal history of other malignant neoplasm of large intestine     Unspecified protein-calorie malnutrition ([address] )     Vascular disorder of intestine ([address] )     Wears glasses     Weight loss      Past Surgical History:   Procedure Laterality Date    ABDOMINAL SURGERY      BREAST BIOPSY Right 05/14/2018    HYSTERECTOMY      IVC FILTER INSERTION  1990    KIDNEY STONE SURGERY      LAPAROTOMY N/A 11/1/2016    Procedure: EXPLORATORY LAPAROTOMY WITH EXTENSIVE LYSIS OF ADHESIONS, SEGMENTAL SMALL BOWEL RESECTION WITH PRIMARY ANASTAMOSIS, REPAIR OF MULTIPLE SMALL BOWEL ENTEROTOMIES;  Surgeon: Bertha Thomas MD;  Location: [facility];  Service:    [address] have been placed to:   IP CONSULT TO ACUTE CARE SURGERY    Vitals:    10/30/18 0630 10/30/18 0700 10/30/18 1613 10/30/18 1720   BP: 111/58 120/59 101/54 100/53   BP Location: Right arm  Right arm Left arm   Pulse: 72 68 80 71   Resp: 18 18 20 18   Temp:    98 2 °F (36 8 °C)   TempSrc:    Oral   SpO2: 95% 97% 93% 94%   Weight:    65 2 kg (143 lb 11 8 oz)   Height:    4' 9" (1 448 m)       Most recent labs:    Recent Labs      10/29/18   2346   10/30/18   1107   WBC  11 73*   --    --    HGB  13 8   --    --    HCT  41 9   --    --    PLT  190   --    --    K  4 5   --    --    NA  138   --    --    CALCIUM  9 5   --    --    BUN  36*   --    --    CREATININE  1 46*   --    --    LIPASE  39*   --    --    INR  4 94*   --    --    TROPONINI  0 10*   < >  0 09*   AST  37   --    --    ALT  29   --    --    ALKPHOS  86   --    --     < > = values in this interval not displayed         Scheduled Meds:  Current Facility-Administered Medications:  acetaminophen 650 mg Oral Q6H PRN Nestora Cogan, PA-C    amLODIPine 5 mg Oral Daily Nestora Cogan, PA-C    anastrozole 1 mg Oral Daily Nestora Cogan, PA-C    calcium carbonate 1 tablet Oral BID With Meals Nestora Cogan, PA-C    docusate sodium 100 mg Oral BID Madison Adorno MD    latanoprost 1 drop Both Eyes  Loretta Hartman PA-C    ondansetron 4 mg Intravenous Q6H PRN Nestora Cogan, PA-C    sodium chloride 75 mL/hr Intravenous Continuous Nestora Cogan, PA-C Last Rate: 75 mL/hr (10/30/18 3538)     Continuous Infusions:  sodium chloride 75 mL/hr Last Rate: 75 mL/hr (10/30/18 3592)     PRN Meds:   acetaminophen    ondansetron    Surgical procedures (if appropriate):

## 2018-10-30 NOTE — PLAN OF CARE
DISCHARGE PLANNING     Discharge to home or other facility with appropriate resources Progressing        GASTROINTESTINAL - ADULT     Minimal or absence of nausea and/or vomiting Progressing     Maintains or returns to baseline bowel function Progressing     Maintains adequate nutritional intake Progressing        Knowledge Deficit     Patient/family/caregiver demonstrates understanding of disease process, treatment plan, medications, and discharge instructions Progressing        PAIN - ADULT     Verbalizes/displays adequate comfort level or baseline comfort level Progressing        Potential for Falls     Patient will remain free of falls Progressing        Prexisting or High Potential for Compromised Skin Integrity     Skin integrity is maintained or improved Progressing        SAFETY ADULT     Maintain or return to baseline ADL function Progressing     Maintain or return mobility status to optimal level Progressing        SKIN/TISSUE INTEGRITY - ADULT     Skin integrity remains intact Progressing

## 2018-10-31 VITALS
OXYGEN SATURATION: 90 % | HEART RATE: 89 BPM | BODY MASS INDEX: 31.01 KG/M2 | DIASTOLIC BLOOD PRESSURE: 54 MMHG | SYSTOLIC BLOOD PRESSURE: 117 MMHG | HEIGHT: 57 IN | RESPIRATION RATE: 18 BRPM | WEIGHT: 143.74 LBS | TEMPERATURE: 98.3 F

## 2018-10-31 PROBLEM — N17.9 AKI (ACUTE KIDNEY INJURY) (HCC): Status: RESOLVED | Noted: 2018-10-30 | Resolved: 2018-10-31

## 2018-10-31 PROBLEM — N39.0 UTI (URINARY TRACT INFECTION): Status: ACTIVE | Noted: 2018-10-31

## 2018-10-31 LAB
ANION GAP SERPL CALCULATED.3IONS-SCNC: 10 MMOL/L (ref 4–13)
BASOPHILS # BLD AUTO: 0.01 THOUSANDS/ΜL (ref 0–0.1)
BASOPHILS NFR BLD AUTO: 0 % (ref 0–1)
BUN SERPL-MCNC: 40 MG/DL (ref 5–25)
CALCIUM SERPL-MCNC: 8.6 MG/DL (ref 8.3–10.1)
CHLORIDE SERPL-SCNC: 104 MMOL/L (ref 100–108)
CO2 SERPL-SCNC: 27 MMOL/L (ref 21–32)
CREAT SERPL-MCNC: 1.21 MG/DL (ref 0.6–1.3)
EOSINOPHIL # BLD AUTO: 0.06 THOUSAND/ΜL (ref 0–0.61)
EOSINOPHIL NFR BLD AUTO: 1 % (ref 0–6)
ERYTHROCYTE [DISTWIDTH] IN BLOOD BY AUTOMATED COUNT: 14.4 % (ref 11.6–15.1)
GFR SERPL CREATININE-BSD FRML MDRD: 37 ML/MIN/1.73SQ M
GLUCOSE SERPL-MCNC: 87 MG/DL (ref 65–140)
HCT VFR BLD AUTO: 37.1 % (ref 34.8–46.1)
HGB BLD-MCNC: 11.5 G/DL (ref 11.5–15.4)
IMM GRANULOCYTES # BLD AUTO: 0.01 THOUSAND/UL (ref 0–0.2)
IMM GRANULOCYTES NFR BLD AUTO: 0 % (ref 0–2)
INR PPP: 4.42 (ref 0.86–1.17)
LYMPHOCYTES # BLD AUTO: 1.38 THOUSANDS/ΜL (ref 0.6–4.47)
LYMPHOCYTES NFR BLD AUTO: 22 % (ref 14–44)
MCH RBC QN AUTO: 28.6 PG (ref 26.8–34.3)
MCHC RBC AUTO-ENTMCNC: 31 G/DL (ref 31.4–37.4)
MCV RBC AUTO: 92 FL (ref 82–98)
MONOCYTES # BLD AUTO: 0.6 THOUSAND/ΜL (ref 0.17–1.22)
MONOCYTES NFR BLD AUTO: 9 % (ref 4–12)
MRSA NOSE QL CULT: NORMAL
NEUTROPHILS # BLD AUTO: 4.37 THOUSANDS/ΜL (ref 1.85–7.62)
NEUTS SEG NFR BLD AUTO: 68 % (ref 43–75)
NRBC BLD AUTO-RTO: 0 /100 WBCS
PLATELET # BLD AUTO: 191 THOUSANDS/UL (ref 149–390)
PMV BLD AUTO: 10 FL (ref 8.9–12.7)
POTASSIUM SERPL-SCNC: 3.8 MMOL/L (ref 3.5–5.3)
PROTHROMBIN TIME: 40.8 SECONDS (ref 11.8–14.2)
RBC # BLD AUTO: 4.02 MILLION/UL (ref 3.81–5.12)
SODIUM SERPL-SCNC: 141 MMOL/L (ref 136–145)
TROPONIN I SERPL-MCNC: 0.07 NG/ML
TROPONIN I SERPL-MCNC: 0.07 NG/ML
WBC # BLD AUTO: 6.43 THOUSAND/UL (ref 4.31–10.16)

## 2018-10-31 PROCEDURE — 80048 BASIC METABOLIC PNL TOTAL CA: CPT | Performed by: PHYSICIAN ASSISTANT

## 2018-10-31 PROCEDURE — 84484 ASSAY OF TROPONIN QUANT: CPT | Performed by: NURSE PRACTITIONER

## 2018-10-31 PROCEDURE — 85610 PROTHROMBIN TIME: CPT | Performed by: NURSE PRACTITIONER

## 2018-10-31 PROCEDURE — 99239 HOSP IP/OBS DSCHRG MGMT >30: CPT | Performed by: NURSE PRACTITIONER

## 2018-10-31 PROCEDURE — 85025 COMPLETE CBC W/AUTO DIFF WBC: CPT | Performed by: NURSE PRACTITIONER

## 2018-10-31 RX ORDER — CEPHALEXIN 250 MG/1
250 CAPSULE ORAL EVERY 8 HOURS SCHEDULED
Status: DISCONTINUED | OUTPATIENT
Start: 2018-10-31 | End: 2018-10-31 | Stop reason: HOSPADM

## 2018-10-31 RX ORDER — CEPHALEXIN 250 MG/1
250 CAPSULE ORAL EVERY 8 HOURS SCHEDULED
Qty: 15 CAPSULE | Refills: 0 | Status: SHIPPED | OUTPATIENT
Start: 2018-10-31 | End: 2018-11-05

## 2018-10-31 RX ORDER — SACCHAROMYCES BOULARDII 250 MG
250 CAPSULE ORAL 2 TIMES DAILY
Status: DISCONTINUED | OUTPATIENT
Start: 2018-10-31 | End: 2018-10-31 | Stop reason: HOSPADM

## 2018-10-31 RX ORDER — SACCHAROMYCES BOULARDII 250 MG
250 CAPSULE ORAL 2 TIMES DAILY
Qty: 10 CAPSULE | Refills: 0 | Status: SHIPPED | OUTPATIENT
Start: 2018-10-31

## 2018-10-31 RX ORDER — DOCUSATE SODIUM 100 MG/1
100 CAPSULE, LIQUID FILLED ORAL 2 TIMES DAILY
Qty: 10 CAPSULE | Refills: 0 | Status: SHIPPED | OUTPATIENT
Start: 2018-10-31

## 2018-10-31 RX ADMIN — DOCUSATE SODIUM 100 MG: 100 CAPSULE, LIQUID FILLED ORAL at 10:28

## 2018-10-31 RX ADMIN — Medication 250 MG: at 19:42

## 2018-10-31 RX ADMIN — ANASTROZOLE 1 MG: 1 TABLET ORAL at 11:43

## 2018-10-31 RX ADMIN — Medication 1 TABLET: at 10:28

## 2018-10-31 RX ADMIN — AMLODIPINE BESYLATE 5 MG: 5 TABLET ORAL at 10:27

## 2018-10-31 RX ADMIN — CEPHALEXIN 250 MG: 250 CAPSULE ORAL at 19:42

## 2018-10-31 RX ADMIN — DOCUSATE SODIUM 100 MG: 100 CAPSULE, LIQUID FILLED ORAL at 19:42

## 2018-10-31 RX ADMIN — Medication 1 TABLET: at 19:42

## 2018-10-31 RX ADMIN — SODIUM CHLORIDE 75 ML/HR: 0.9 INJECTION, SOLUTION INTRAVENOUS at 02:10

## 2018-10-31 NOTE — ASSESSMENT & PLAN NOTE
· Dx May 2018, Follows with Dr Carmencita Jackson  · Pt declined surgery  · Has shown partial response to daily anastrazole on OP visit in Sep 2018  · Continue anastrazole

## 2018-10-31 NOTE — ASSESSMENT & PLAN NOTE
· History of   · On coumadin  · Arrived with INR of 4  9  Today 4 42   · Hold dose today and tomorrow  · [place] [facility]  · No signs of bleeding  CBC stable

## 2018-10-31 NOTE — SOCIAL WORK
LOS 1 day  Pt is from [facility] in [place]  Referral placed for anticipated return

## 2018-10-31 NOTE — ASSESSMENT & PLAN NOTE
· Likely type 2 NSTEMI  Secondary to dehydration  · Troponin 0 1 on admission  Trending down  · Telemetry shows NSR  · Patient denies chest pain

## 2018-10-31 NOTE — ASSESSMENT & PLAN NOTE
· Cr 1 46 on arrival up from baseline of approx ?1-1 2  · Creatinine 1 21 today    · Likely pre-renal d/t poor PO intake 2/2 PSBO

## 2018-10-31 NOTE — ASSESSMENT & PLAN NOTE
· Likely due to adhesions from prior abdominal surgeries  A/e/b abdominal pain, nausea, vomiting  · CT abdomen pelvis showed partial small-bowel obstruction at site of pelvic anastomosis requiring bowel rest, IV fluids  · Patient seen by surgery  No acute surgical intervention indicated  · Abdominal pain, nausea, vomiting resolved with NPO/IVF  Patient passing flatus with small BM yesterday  · Patient tolerated soft diet today  · Cleared for discharge from surgery standpoint  · Soft diet for next 3-5 days per surgery

## 2018-10-31 NOTE — DISCHARGE INSTRUCTIONS
Soft diet for 3-5 days  Complete antibiotic for UTI  INR 4 42 today  Hold coumadin today and tomorrow  Check  INR on Friday  Resume coumadin if INR < 3

## 2018-10-31 NOTE — ASSESSMENT & PLAN NOTE
· Urine culture showed 60,000-69,000 cfu/ml Gram Negative Thien (A)  · Keflex 250mg p o  Q 8 hours for 5 days

## 2018-10-31 NOTE — SOCIAL WORK
Pt to be d/c'd back to [facility] today  Awaiting return call for transport time  Nurse Jesus Diaz and facility aware

## 2018-10-31 NOTE — PROGRESS NOTES
Pt sleeping  Offers no complaints at this time  Call bell within reach  Bed low and locked  Will continue to monitor

## 2018-10-31 NOTE — PROGRESS NOTES
denies any abdominal complaints  No nausea or vomiting  Abdomen flat soft and nontender  Impression:  Partial small bowel obstruction  Clinically appears to be resolving    Plan:  Full liquids toast crackers    Could advance the soft diet this evening  Ensure supplements  Further treatment per Medicine

## 2018-10-31 NOTE — DISCHARGE SUMMARY
Discharge- Milly Spangler 11/19/1919, 80 y o  female MRN: [id number]    Unit/Bed#: -01 Encounter: [id number]    Primary Care Provider: Rufina Dolan MD   Date and time admitted to hospital: 10/29/2018 10:03 PM        * Small bowel obstruction, partial ([facility] )   Assessment & Plan    · Likely due to adhesions from prior abdominal surgeries  A/e/b abdominal pain, nausea, vomiting  · CT abdomen pelvis showed partial small-bowel obstruction at site of pelvic anastomosis requiring bowel rest, IV fluids  · Patient seen by surgery  No acute surgical intervention indicated  · Abdominal pain, nausea, vomiting resolved with NPO/IVF  Patient passing flatus with small BM yesterday  · Patient tolerated soft diet today  · Cleared for discharge from surgery standpoint  · Soft diet for next 3-5 days per surgery  UTI (urinary tract infection)   Assessment & Plan    · Urine culture showed 60,000-69,000 cfu/ml Gram Negative Thien (A)  · Keflex 250mg p o  Q 8 hours for 5 days  Elevated troponin   Assessment & Plan    · Likely type 2 NSTEMI  Secondary to dehydration  · Troponin 0 1 on admission  Trending down  · Telemetry shows NSR  · Patient denies chest pain  Hypertension   Assessment & Plan    · Controlled, continue amlodipine     CHRISTINA (acute kidney injury) (HCC)resolved as of 10/31/2018   Assessment & Plan    · Cr 1 46 on arrival up from baseline of approx ?1-1 2  · Creatinine 1 21 today  · Likely pre-renal d/t poor PO intake 2/2 PSBO       Malignant neoplasm of upper-outer quadrant of right breast in female, estrogen receptor positive [facility])   Assessment & Plan    · Dx May 2018, Follows with Dr Lior Zepeda  · Pt declined surgery  · Has shown partial response to daily anastrazole on OP visit in Sep 2018  · Continue anastrazole      Deep vein thrombosis (DVT) of lower extremity ([facility] )   Assessment & Plan    · History of   · On coumadin  · Arrived with INR of 4  9  Today 4 42   · Hold dose today and tomorrow  · Check INR on Friday in [address] [place] [address]  · No signs of bleeding  CBC stable  Discharge Summary - [facility]    Patient Information: Elvia Barr 80 y o  female MRN: [id number]  Unit/Bed#: -01 Encounter: [id number]    Discharging Physician / Practitioner: ART Vila  PCP: Fernande Rinne, MD  Admission Date: 10/29/2018  Discharge Date: 10/31/18    Reason for Admission:  Partial small-bowel obstruction  Acute kidney injury  Elevated troponin  Discharge Diagnoses:     Principal Problem:    Small bowel obstruction, partial ([facility] )  Active Problems:    UTI (urinary tract infection)    Elevated troponin    Hypertension    Deep vein thrombosis (DVT) of lower extremity (HCC)    Malignant neoplasm of upper-outer quadrant of right breast in female, estrogen receptor positive ([facility] )  Resolved Problems:    CHRISTINA (acute kidney injury) ([facility] )      Consultations During Hospital Stay:  · General surgery Dr Amy Yeh    Procedures Performed:     · CT abdomen pelvis - partial small bowel obstruction at the site of a pelvic anastomosis  · Chest x-ray - Bibasilar subsegmental atelectasis  Significant Findings:     · As above    Incidental Findings:   · None     Test Results Pending at Discharge (will require follow up): · None     Outpatient Tests Requested:  · None    Complications:  None    Hospital Course:     Elvia Barr is a 80 y o  female patient who originally presented to the hospital on 10/29/2018 due to partial SBO which was resolved with conservative management of NPO/IVF  Patient was passing flatus and had small BM yesterday  Patient tolerated soft diet  Cleared for discharge by surgery  Continue soft diet for next 3-5 days  CHRISTINA resolved after IV hydration  Elevated Troponin was likely type 2 due to dehydration  UA on admission was mildly positive,urine culture showed 60,000-69,000 cfu/ml Gram Negative Thien  Spoke to attending, will order keflex for 5 days  INR was supratheraputic at 4 94 on admission  Today is 4 42  No signs of bleeding  CBC normal  Will discharge patient back to LTC  Spoke to family, all questions answered  Condition at Discharge: good     Discharge Day Visit / Exam:     Subjective:  Patient denies abdomen pain,N/V  Denies CP,HA,Dizziness,SOB,fever,chills  Vitals: Blood Pressure: 117/54 (10/31/18 1535)  Pulse: 89 (10/31/18 1535)  Temperature: 98 3 °F (36 8 °C) (10/31/18 1535)  Temp Source: Oral (10/31/18 1535)  Respirations: 18 (10/31/18 1535)  Height: 4' 9" (144 8 cm) (10/30/18 1720)  Weight - Scale: 65 2 kg (143 lb 11 8 oz) (10/30/18 1720)  SpO2: 90 % (10/31/18 1535)  Exam:   Physical Exam   Constitutional: She is oriented to person, place, and time  She appears well-developed and well-nourished  HENT:   Head: Normocephalic and atraumatic  Neck: Normal range of motion  Neck supple  Cardiovascular: Normal rate and regular rhythm  Exam reveals no gallop and no friction rub  No murmur heard  Pulmonary/Chest: Effort normal and breath sounds normal  No respiratory distress  She has no wheezes  She has no rales  Abdominal: Soft  Bowel sounds are normal  She exhibits no distension  There is no tenderness  There is no rebound  Old surgical scar on abdomen  Musculoskeletal: Normal range of motion  She exhibits no edema, tenderness or deformity  Neurological: She is alert and oriented to person, place, and time  Skin: Skin is warm and dry  Psychiatric: She has a normal mood and affect  Nursing note and vitals reviewed  Discharge instructions/Information to patient and family:   See after visit summary for information provided to patient and family  Provisions for Follow-Up Care:  See after visit summary for information related to follow-up care and any pertinent home health orders  Disposition:     [address] at [facility]      For Discharges to [facility] SNF:   · Not Applicable to this Patient - Not Applicable to this Patient    Planned Readmission: no     Discharge Statement:  I spent 35 minutes discharging the patient  This time was spent on the day of discharge  I had direct contact with the patient on the day of discharge  Greater than 50% of the total time was spent examining patient, answering all patient questions, arranging and discussing plan of care with patient as well as directly providing post-discharge instructions  Additional time then spent on discharge activities  Discharge Medications:  See after visit summary for reconciled discharge medications provided to patient and family  ** Please Note: Dragon 360 Dictation voice to text software may have been used in the creation of this document   **

## 2018-11-01 LAB — BACTERIA UR CULT: ABNORMAL

## 2019-03-21 ENCOUNTER — OFFICE VISIT (OUTPATIENT)
Dept: HEMATOLOGY ONCOLOGY | Facility: CLINIC | Age: 84
End: 2019-03-21
Payer: MEDICARE

## 2019-03-21 VITALS
TEMPERATURE: 97.9 F | OXYGEN SATURATION: 96 % | RESPIRATION RATE: 19 BRPM | HEART RATE: 83 BPM | SYSTOLIC BLOOD PRESSURE: 140 MMHG | DIASTOLIC BLOOD PRESSURE: 72 MMHG

## 2019-03-21 DIAGNOSIS — Z17.0 MALIGNANT NEOPLASM OF UPPER-OUTER QUADRANT OF RIGHT BREAST IN FEMALE, ESTROGEN RECEPTOR POSITIVE (HCC): Primary | ICD-10-CM

## 2019-03-21 DIAGNOSIS — C50.411 MALIGNANT NEOPLASM OF UPPER-OUTER QUADRANT OF RIGHT BREAST IN FEMALE, ESTROGEN RECEPTOR POSITIVE (HCC): Primary | ICD-10-CM

## 2019-03-21 PROCEDURE — 99214 OFFICE O/P EST MOD 30 MIN: CPT | Performed by: INTERNAL MEDICINE

## 2019-03-21 NOTE — PROGRESS NOTES
Hematology / Oncology Outpatient Follow Up Note    Gaviota Thacker 80 y o  female :1919 PGU:[id number]         Date:  3/21/2019    Assessment / Plan:  A 80year-old postmenopausal woman with clinical stage IIA right breast cancer, grade 2, % positive, % positive, HER2 negative disease   She does not wish to have surgical treatment for breast cancer  Since May 2018, she has been on hormonal therapy with anastrozole with no toxicity  Based on the physical examination, she has very good partial response  I recommended her to continue with anastrozole 1 mg once a day  She is in agreement with my recommendation  I will see her again in a year for routine follow-up           Subjective:      HPI: [de-identified] 80year old postmenopausal woman who recently noticed a lump in the right breast which she brought to medical attention   Mammography and ultrasound showed approximately 3 cm of mass, located at 10 o'clock position from nipple in the right breast   She underwent right breast ultrasound-guided biopsy which showed invasive ductal carcinoma, grade 2   This was % positive, % positive, HER2 negative disease   She was seen by Dr Emma Schmtit yesterday  Umberto Cloud does not wish to have surgical treatment for the breast cancer   Therefore, she presents today to discuss medical treatment   She has no breast related symptomatology   She denied any pain   She has no respiratory symptoms   She has baseline ambulatory dysfunction   She normally use a walker for ambulation   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   Her performance status is 2/4 on the ECOG scale            Interval History:  A 80year-old postmenopausal woman with clinical stage IIA right breast cancer, grade 2, % positive, % positive, HER2 negative disease   She does not wish to have surgical treatment for breast cancer    Since May 2018, she has been on hormonal therapy with anastrozole  She presents today for routine follow-up  She has no new complaint  She has baseline ambulatory dysfunction  She presented in wheelchair  She has no complaint of pain  She denied hot flashes or musculoskeletal symptoms  Lately, she has not noticed any mass in right breast    She has no respiratory symptoms                                       Objective:      Primary Diagnosis:     Clinical stage IIA right breast cancer, grade 2, % positive, % positive, HER2 negative disease   Diagnosed in May 2018      Cancer Staging:  Cancer Staging  Malignant neoplasm of upper-outer quadrant of right breast in female, estrogen receptor positive ([facility] [address] )  Staging form: Breast, AJCC 8th Edition  - Clinical: Stage IB (cT2, cN0, cM0, G2, ER: Positive, VT: Positive, HER2: Negative) - Signed by Elijah Clinton MD on 5/23/2018           Previous Hematologic/ Oncologic Treatment:            Current Hematologic/ Oncologic Treatment:       Hormonal therapy with anastrozole since May 2018      Disease Status:      Clinical good partial response      Test Results:     Pathology:     Right breast biopsy showed invasive ductal carcinoma, grade 2   % positive, % positive, HER2 negative disease      Radiology:     Mammography and ultrasound showed a 3 3 times to use x 2 cm of right breast mass located at 10 o'clock position 7 cm from nipple in the right breast      Laboratory:           Physical Exam:        General Appearance:    Alert, oriented          Eyes:    PERRL   Ears:    Normal external ear canals, both ears   Nose:   Nares normal, septum midline   Throat:   Mucosa moist  Pharynx without injection      Neck:   Supple         Lungs:     Clear to auscultation bilaterally   Chest Wall:    No tenderness or deformity    Heart:    Regular rate and rhythm         Abdomen:     Soft, non-tender, bowel sounds +, no organomegaly               Extremities:   Extremities no cyanosis or edema         Skin:   no rash or icterus  Lymph nodes:   Cervical, supraclavicular, and axillary nodes normal   Neurologic:   CNII-XII intact, normal strength, sensation and reflexes     Throughout             Breast exam:  1 x 1  cm of mass at outer upper quadrant of the right breast   Left breast exam is negative  ROS: Review of Systems   Neurological:        Ambulatory dysfunction   All other systems reviewed and are negative  Imaging: No results found  Labs:   Lab Results   Component Value Date    WBC 6 43 10/31/2018    HGB 11 5 10/31/2018    HCT 37 1 10/31/2018    MCV 92 10/31/2018     10/31/2018     Lab Results   Component Value Date     11/30/2015    K 3 8 10/31/2018     10/31/2018    CO2 27 10/31/2018    ANIONGAP 7 11/30/2015    BUN 40 (H) 10/31/2018    CREATININE 1 21 10/31/2018    GLUCOSE 111 11/30/2015    CALCIUM 8 6 10/31/2018    AST 37 10/29/2018    ALT 29 10/29/2018    ALKPHOS 86 10/29/2018    PROT 7 0 02/17/2015    BILITOT 0 5 02/17/2015    EGFR 37 10/31/2018         Lab Results   Component Value Date    IRON 50 11/14/2016    TIBC 150 (L) 11/14/2016    FERRITIN 87 11/14/2016       Current Medications: Reviewed  Allergies: Reviewed  PMH/FH/SH:  Reviewed      Vital Sign:    There is no height or weight on file to calculate BSA      Wt Readings from Last 3 Encounters:   10/30/18 65 2 kg (143 lb 11 8 oz)   09/20/18 65 8 kg (145 lb)   05/24/18 63 5 kg (140 lb)        Temp Readings from Last 3 Encounters:   03/21/19 97 9 °F (36 6 °C)   10/31/18 98 3 °F (36 8 °C) (Oral)   09/20/18 (!) 97 4 °F (36 3 °C) (Tympanic)        BP Readings from Last 3 Encounters:   03/21/19 140/72   10/31/18 117/54   09/20/18 142/70         Pulse Readings from Last 3 Encounters:   03/21/19 83   10/31/18 89   09/20/18 78     @LASTSAO2(3)@

## 2019-06-22 ENCOUNTER — APPOINTMENT (EMERGENCY)
Dept: CT IMAGING | Facility: HOSPITAL | Age: 84
DRG: 390 | End: 2019-06-22
Payer: MEDICARE

## 2019-06-22 ENCOUNTER — HOSPITAL ENCOUNTER (INPATIENT)
Facility: HOSPITAL | Age: 84
LOS: 3 days | DRG: 390 | End: 2019-06-25
Attending: EMERGENCY MEDICINE | Admitting: INTERNAL MEDICINE
Payer: MEDICARE

## 2019-06-22 DIAGNOSIS — K56.600 PARTIAL SMALL BOWEL OBSTRUCTION (HCC): Primary | ICD-10-CM

## 2019-06-22 PROBLEM — Z86.718 HISTORY OF DVT (DEEP VEIN THROMBOSIS): Status: ACTIVE | Noted: 2019-06-22

## 2019-06-22 LAB
ALBUMIN SERPL BCP-MCNC: 3.5 G/DL (ref 3.5–5)
ALP SERPL-CCNC: 87 U/L (ref 46–116)
ALT SERPL W P-5'-P-CCNC: 17 U/L (ref 12–78)
ANION GAP SERPL CALCULATED.3IONS-SCNC: 9 MMOL/L (ref 4–13)
AST SERPL W P-5'-P-CCNC: 17 U/L (ref 5–45)
BASOPHILS # BLD AUTO: 0.01 THOUSANDS/ΜL (ref 0–0.1)
BASOPHILS NFR BLD AUTO: 0 % (ref 0–1)
BILIRUB SERPL-MCNC: 0.35 MG/DL (ref 0.2–1)
BUN SERPL-MCNC: 20 MG/DL (ref 5–25)
CALCIUM SERPL-MCNC: 9.8 MG/DL (ref 8.3–10.1)
CHLORIDE SERPL-SCNC: 99 MMOL/L (ref 100–108)
CO2 SERPL-SCNC: 29 MMOL/L (ref 21–32)
CREAT SERPL-MCNC: 0.92 MG/DL (ref 0.6–1.3)
EOSINOPHIL # BLD AUTO: 0.02 THOUSAND/ΜL (ref 0–0.61)
EOSINOPHIL NFR BLD AUTO: 0 % (ref 0–6)
ERYTHROCYTE [DISTWIDTH] IN BLOOD BY AUTOMATED COUNT: 13.9 % (ref 11.6–15.1)
GFR SERPL CREATININE-BSD FRML MDRD: 52 ML/MIN/1.73SQ M
GLUCOSE SERPL-MCNC: 130 MG/DL (ref 65–140)
HCT VFR BLD AUTO: 40.5 % (ref 34.8–46.1)
HGB BLD-MCNC: 12.7 G/DL (ref 11.5–15.4)
IMM GRANULOCYTES # BLD AUTO: 0.02 THOUSAND/UL (ref 0–0.2)
IMM GRANULOCYTES NFR BLD AUTO: 0 % (ref 0–2)
LIPASE SERPL-CCNC: 35 U/L (ref 73–393)
LYMPHOCYTES # BLD AUTO: 0.88 THOUSANDS/ΜL (ref 0.6–4.47)
LYMPHOCYTES NFR BLD AUTO: 13 % (ref 14–44)
MCH RBC QN AUTO: 29.3 PG (ref 26.8–34.3)
MCHC RBC AUTO-ENTMCNC: 31.4 G/DL (ref 31.4–37.4)
MCV RBC AUTO: 94 FL (ref 82–98)
MONOCYTES # BLD AUTO: 0.48 THOUSAND/ΜL (ref 0.17–1.22)
MONOCYTES NFR BLD AUTO: 7 % (ref 4–12)
NEUTROPHILS # BLD AUTO: 5.59 THOUSANDS/ΜL (ref 1.85–7.62)
NEUTS SEG NFR BLD AUTO: 80 % (ref 43–75)
NRBC BLD AUTO-RTO: 0 /100 WBCS
PLATELET # BLD AUTO: 212 THOUSANDS/UL (ref 149–390)
PMV BLD AUTO: 9.5 FL (ref 8.9–12.7)
POTASSIUM SERPL-SCNC: 4 MMOL/L (ref 3.5–5.3)
PROT SERPL-MCNC: 7.3 G/DL (ref 6.4–8.2)
RBC # BLD AUTO: 4.33 MILLION/UL (ref 3.81–5.12)
SODIUM SERPL-SCNC: 137 MMOL/L (ref 136–145)
WBC # BLD AUTO: 7 THOUSAND/UL (ref 4.31–10.16)

## 2019-06-22 PROCEDURE — 83690 ASSAY OF LIPASE: CPT | Performed by: EMERGENCY MEDICINE

## 2019-06-22 PROCEDURE — 99285 EMERGENCY DEPT VISIT HI MDM: CPT | Performed by: EMERGENCY MEDICINE

## 2019-06-22 PROCEDURE — 96375 TX/PRO/DX INJ NEW DRUG ADDON: CPT

## 2019-06-22 PROCEDURE — 99223 1ST HOSP IP/OBS HIGH 75: CPT | Performed by: PHYSICIAN ASSISTANT

## 2019-06-22 PROCEDURE — 99285 EMERGENCY DEPT VISIT HI MDM: CPT

## 2019-06-22 PROCEDURE — 96374 THER/PROPH/DIAG INJ IV PUSH: CPT

## 2019-06-22 PROCEDURE — 80053 COMPREHEN METABOLIC PANEL: CPT | Performed by: EMERGENCY MEDICINE

## 2019-06-22 PROCEDURE — 74177 CT ABD & PELVIS W/CONTRAST: CPT

## 2019-06-22 PROCEDURE — 85025 COMPLETE CBC W/AUTO DIFF WBC: CPT | Performed by: EMERGENCY MEDICINE

## 2019-06-22 PROCEDURE — 36415 COLL VENOUS BLD VENIPUNCTURE: CPT | Performed by: EMERGENCY MEDICINE

## 2019-06-22 RX ORDER — KETOROLAC TROMETHAMINE 30 MG/ML
15 INJECTION, SOLUTION INTRAMUSCULAR; INTRAVENOUS EVERY 6 HOURS PRN
Status: DISCONTINUED | OUTPATIENT
Start: 2019-06-22 | End: 2019-06-22

## 2019-06-22 RX ORDER — ONDANSETRON 2 MG/ML
4 INJECTION INTRAMUSCULAR; INTRAVENOUS EVERY 6 HOURS PRN
Status: DISCONTINUED | OUTPATIENT
Start: 2019-06-22 | End: 2019-06-25 | Stop reason: HOSPADM

## 2019-06-22 RX ORDER — ACETAMINOPHEN 325 MG/1
650 TABLET ORAL ONCE
Status: DISCONTINUED | OUTPATIENT
Start: 2019-06-22 | End: 2019-06-22

## 2019-06-22 RX ORDER — ANASTROZOLE 1 MG/1
1 TABLET ORAL DAILY
Status: DISCONTINUED | OUTPATIENT
Start: 2019-06-23 | End: 2019-06-25 | Stop reason: HOSPADM

## 2019-06-22 RX ORDER — ONDANSETRON 2 MG/ML
4 INJECTION INTRAMUSCULAR; INTRAVENOUS ONCE AS NEEDED
Status: DISCONTINUED | OUTPATIENT
Start: 2019-06-22 | End: 2019-06-22

## 2019-06-22 RX ORDER — ONDANSETRON 2 MG/ML
4 INJECTION INTRAMUSCULAR; INTRAVENOUS ONCE
Status: COMPLETED | OUTPATIENT
Start: 2019-06-22 | End: 2019-06-22

## 2019-06-22 RX ORDER — FENTANYL CITRATE 50 UG/ML
25 INJECTION, SOLUTION INTRAMUSCULAR; INTRAVENOUS
Status: DISCONTINUED | OUTPATIENT
Start: 2019-06-22 | End: 2019-06-22

## 2019-06-22 RX ORDER — SODIUM CHLORIDE 9 MG/ML
75 INJECTION, SOLUTION INTRAVENOUS CONTINUOUS
Status: DISCONTINUED | OUTPATIENT
Start: 2019-06-22 | End: 2019-06-24

## 2019-06-22 RX ORDER — LATANOPROST 50 UG/ML
1 SOLUTION/ DROPS OPHTHALMIC
Status: DISCONTINUED | OUTPATIENT
Start: 2019-06-22 | End: 2019-06-25 | Stop reason: HOSPADM

## 2019-06-22 RX ORDER — ACETAMINOPHEN 650 MG/1
650 SUPPOSITORY RECTAL EVERY 4 HOURS PRN
Status: DISCONTINUED | OUTPATIENT
Start: 2019-06-22 | End: 2019-06-25 | Stop reason: HOSPADM

## 2019-06-22 RX ADMIN — IOHEXOL 50 ML: 240 INJECTION, SOLUTION INTRATHECAL; INTRAVASCULAR; INTRAVENOUS; ORAL at 17:10

## 2019-06-22 RX ADMIN — FENTANYL CITRATE 25 MCG: 50 INJECTION INTRAMUSCULAR; INTRAVENOUS at 19:06

## 2019-06-22 RX ADMIN — SODIUM CHLORIDE 50 ML/HR: 0.9 INJECTION, SOLUTION INTRAVENOUS at 19:06

## 2019-06-22 RX ADMIN — ONDANSETRON 4 MG: 2 INJECTION INTRAMUSCULAR; INTRAVENOUS at 23:13

## 2019-06-22 RX ADMIN — IODIXANOL 85 ML: 320 INJECTION, SOLUTION INTRAVASCULAR at 17:10

## 2019-06-22 RX ADMIN — LATANOPROST 1 DROP: 50 SOLUTION OPHTHALMIC at 21:00

## 2019-06-22 RX ADMIN — ONDANSETRON 4 MG: 2 INJECTION INTRAMUSCULAR; INTRAVENOUS at 15:57

## 2019-06-23 ENCOUNTER — APPOINTMENT (INPATIENT)
Dept: RADIOLOGY | Facility: HOSPITAL | Age: 84
DRG: 390 | End: 2019-06-23
Payer: MEDICARE

## 2019-06-23 LAB
ALBUMIN SERPL BCP-MCNC: 3 G/DL (ref 3.5–5)
ALP SERPL-CCNC: 83 U/L (ref 46–116)
ALT SERPL W P-5'-P-CCNC: 14 U/L (ref 12–78)
ANION GAP SERPL CALCULATED.3IONS-SCNC: 10 MMOL/L (ref 4–13)
AST SERPL W P-5'-P-CCNC: 20 U/L (ref 5–45)
BASOPHILS # BLD AUTO: 0.01 THOUSANDS/ΜL (ref 0–0.1)
BASOPHILS NFR BLD AUTO: 0 % (ref 0–1)
BILIRUB SERPL-MCNC: 0.33 MG/DL (ref 0.2–1)
BUN SERPL-MCNC: 20 MG/DL (ref 5–25)
CALCIUM SERPL-MCNC: 9.2 MG/DL (ref 8.3–10.1)
CHLORIDE SERPL-SCNC: 103 MMOL/L (ref 100–108)
CO2 SERPL-SCNC: 24 MMOL/L (ref 21–32)
CREAT SERPL-MCNC: 0.89 MG/DL (ref 0.6–1.3)
EOSINOPHIL # BLD AUTO: 0 THOUSAND/ΜL (ref 0–0.61)
EOSINOPHIL NFR BLD AUTO: 0 % (ref 0–6)
ERYTHROCYTE [DISTWIDTH] IN BLOOD BY AUTOMATED COUNT: 14 % (ref 11.6–15.1)
GFR SERPL CREATININE-BSD FRML MDRD: 54 ML/MIN/1.73SQ M
GLUCOSE SERPL-MCNC: 130 MG/DL (ref 65–140)
HCT VFR BLD AUTO: 39.5 % (ref 34.8–46.1)
HGB BLD-MCNC: 12.4 G/DL (ref 11.5–15.4)
IMM GRANULOCYTES # BLD AUTO: 0.02 THOUSAND/UL (ref 0–0.2)
IMM GRANULOCYTES NFR BLD AUTO: 0 % (ref 0–2)
LYMPHOCYTES # BLD AUTO: 0.85 THOUSANDS/ΜL (ref 0.6–4.47)
LYMPHOCYTES NFR BLD AUTO: 12 % (ref 14–44)
MCH RBC QN AUTO: 29.5 PG (ref 26.8–34.3)
MCHC RBC AUTO-ENTMCNC: 31.4 G/DL (ref 31.4–37.4)
MCV RBC AUTO: 94 FL (ref 82–98)
MONOCYTES # BLD AUTO: 0.47 THOUSAND/ΜL (ref 0.17–1.22)
MONOCYTES NFR BLD AUTO: 6 % (ref 4–12)
NEUTROPHILS # BLD AUTO: 6 THOUSANDS/ΜL (ref 1.85–7.62)
NEUTS SEG NFR BLD AUTO: 82 % (ref 43–75)
NRBC BLD AUTO-RTO: 0 /100 WBCS
PLATELET # BLD AUTO: 218 THOUSANDS/UL (ref 149–390)
PMV BLD AUTO: 9.7 FL (ref 8.9–12.7)
POTASSIUM SERPL-SCNC: 4 MMOL/L (ref 3.5–5.3)
PROT SERPL-MCNC: 6.8 G/DL (ref 6.4–8.2)
RBC # BLD AUTO: 4.21 MILLION/UL (ref 3.81–5.12)
SODIUM SERPL-SCNC: 137 MMOL/L (ref 136–145)
WBC # BLD AUTO: 7.35 THOUSAND/UL (ref 4.31–10.16)

## 2019-06-23 PROCEDURE — 99232 SBSQ HOSP IP/OBS MODERATE 35: CPT | Performed by: INTERNAL MEDICINE

## 2019-06-23 PROCEDURE — 85025 COMPLETE CBC W/AUTO DIFF WBC: CPT | Performed by: PHYSICIAN ASSISTANT

## 2019-06-23 PROCEDURE — C9113 INJ PANTOPRAZOLE SODIUM, VIA: HCPCS | Performed by: PHYSICIAN ASSISTANT

## 2019-06-23 PROCEDURE — 80053 COMPREHEN METABOLIC PANEL: CPT | Performed by: PHYSICIAN ASSISTANT

## 2019-06-23 PROCEDURE — 71045 X-RAY EXAM CHEST 1 VIEW: CPT

## 2019-06-23 PROCEDURE — 99222 1ST HOSP IP/OBS MODERATE 55: CPT | Performed by: SURGERY

## 2019-06-23 RX ORDER — ONDANSETRON 2 MG/ML
4 INJECTION INTRAMUSCULAR; INTRAVENOUS ONCE
Status: COMPLETED | OUTPATIENT
Start: 2019-06-23 | End: 2019-06-23

## 2019-06-23 RX ORDER — PANTOPRAZOLE SODIUM 40 MG/1
40 INJECTION, POWDER, FOR SOLUTION INTRAVENOUS
Status: DISCONTINUED | OUTPATIENT
Start: 2019-06-23 | End: 2019-06-23

## 2019-06-23 RX ORDER — PANTOPRAZOLE SODIUM 40 MG/1
40 TABLET, DELAYED RELEASE ORAL
Status: DISCONTINUED | OUTPATIENT
Start: 2019-06-24 | End: 2019-06-25 | Stop reason: HOSPADM

## 2019-06-23 RX ADMIN — APIXABAN 2.5 MG: 2.5 TABLET, FILM COATED ORAL at 09:05

## 2019-06-23 RX ADMIN — PANTOPRAZOLE SODIUM 40 MG: 40 INJECTION, POWDER, FOR SOLUTION INTRAVENOUS at 03:15

## 2019-06-23 RX ADMIN — ANASTROZOLE 1 MG: 1 TABLET, COATED ORAL at 09:06

## 2019-06-23 RX ADMIN — ONDANSETRON 4 MG: 2 INJECTION INTRAMUSCULAR; INTRAVENOUS at 03:15

## 2019-06-23 RX ADMIN — APIXABAN 2.5 MG: 2.5 TABLET, FILM COATED ORAL at 17:14

## 2019-06-23 RX ADMIN — SODIUM CHLORIDE 50 ML/HR: 0.9 INJECTION, SOLUTION INTRAVENOUS at 02:17

## 2019-06-23 RX ADMIN — LATANOPROST 1 DROP: 50 SOLUTION OPHTHALMIC at 21:34

## 2019-06-23 RX ADMIN — SODIUM CHLORIDE 75 ML/HR: 0.9 INJECTION, SOLUTION INTRAVENOUS at 18:16

## 2019-06-24 LAB
ANION GAP SERPL CALCULATED.3IONS-SCNC: 8 MMOL/L (ref 4–13)
BACTERIA UR QL AUTO: ABNORMAL /HPF
BASOPHILS # BLD AUTO: 0.03 THOUSANDS/ΜL (ref 0–0.1)
BASOPHILS NFR BLD AUTO: 1 % (ref 0–1)
BILIRUB UR QL STRIP: NEGATIVE
BUN SERPL-MCNC: 18 MG/DL (ref 5–25)
CALCIUM SERPL-MCNC: 8.4 MG/DL (ref 8.3–10.1)
CHLORIDE SERPL-SCNC: 106 MMOL/L (ref 100–108)
CLARITY UR: CLEAR
CO2 SERPL-SCNC: 26 MMOL/L (ref 21–32)
COLOR UR: YELLOW
CREAT SERPL-MCNC: 0.88 MG/DL (ref 0.6–1.3)
EOSINOPHIL # BLD AUTO: 0.14 THOUSAND/ΜL (ref 0–0.61)
EOSINOPHIL NFR BLD AUTO: 3 % (ref 0–6)
ERYTHROCYTE [DISTWIDTH] IN BLOOD BY AUTOMATED COUNT: 14.3 % (ref 11.6–15.1)
GFR SERPL CREATININE-BSD FRML MDRD: 54 ML/MIN/1.73SQ M
GLUCOSE SERPL-MCNC: 92 MG/DL (ref 65–140)
GLUCOSE UR STRIP-MCNC: NEGATIVE MG/DL
HCT VFR BLD AUTO: 39.3 % (ref 34.8–46.1)
HGB BLD-MCNC: 11.9 G/DL (ref 11.5–15.4)
HGB UR QL STRIP.AUTO: ABNORMAL
IMM GRANULOCYTES # BLD AUTO: 0.02 THOUSAND/UL (ref 0–0.2)
IMM GRANULOCYTES NFR BLD AUTO: 0 % (ref 0–2)
KETONES UR STRIP-MCNC: NEGATIVE MG/DL
LEUKOCYTE ESTERASE UR QL STRIP: ABNORMAL
LYMPHOCYTES # BLD AUTO: 2.13 THOUSANDS/ΜL (ref 0.6–4.47)
LYMPHOCYTES NFR BLD AUTO: 40 % (ref 14–44)
MCH RBC QN AUTO: 29.5 PG (ref 26.8–34.3)
MCHC RBC AUTO-ENTMCNC: 30.3 G/DL (ref 31.4–37.4)
MCV RBC AUTO: 98 FL (ref 82–98)
MONOCYTES # BLD AUTO: 0.52 THOUSAND/ΜL (ref 0.17–1.22)
MONOCYTES NFR BLD AUTO: 10 % (ref 4–12)
NEUTROPHILS # BLD AUTO: 2.45 THOUSANDS/ΜL (ref 1.85–7.62)
NEUTS SEG NFR BLD AUTO: 46 % (ref 43–75)
NITRITE UR QL STRIP: POSITIVE
NON-SQ EPI CELLS URNS QL MICRO: ABNORMAL /HPF
NRBC BLD AUTO-RTO: 0 /100 WBCS
PH UR STRIP.AUTO: 5.5 [PH]
PLATELET # BLD AUTO: 192 THOUSANDS/UL (ref 149–390)
PMV BLD AUTO: 9.7 FL (ref 8.9–12.7)
POTASSIUM SERPL-SCNC: 4 MMOL/L (ref 3.5–5.3)
PROT UR STRIP-MCNC: NEGATIVE MG/DL
RBC # BLD AUTO: 4.03 MILLION/UL (ref 3.81–5.12)
RBC #/AREA URNS AUTO: ABNORMAL /HPF
SODIUM SERPL-SCNC: 140 MMOL/L (ref 136–145)
SP GR UR STRIP.AUTO: 1.01 (ref 1–1.03)
UROBILINOGEN UR QL STRIP.AUTO: 0.2 E.U./DL
WBC # BLD AUTO: 5.29 THOUSAND/UL (ref 4.31–10.16)
WBC #/AREA URNS AUTO: ABNORMAL /HPF

## 2019-06-24 PROCEDURE — 99232 SBSQ HOSP IP/OBS MODERATE 35: CPT | Performed by: SURGERY

## 2019-06-24 PROCEDURE — 81001 URINALYSIS AUTO W/SCOPE: CPT | Performed by: PHYSICIAN ASSISTANT

## 2019-06-24 PROCEDURE — 80048 BASIC METABOLIC PNL TOTAL CA: CPT | Performed by: INTERNAL MEDICINE

## 2019-06-24 PROCEDURE — 99232 SBSQ HOSP IP/OBS MODERATE 35: CPT | Performed by: INTERNAL MEDICINE

## 2019-06-24 PROCEDURE — 85025 COMPLETE CBC W/AUTO DIFF WBC: CPT | Performed by: INTERNAL MEDICINE

## 2019-06-24 RX ADMIN — PANTOPRAZOLE SODIUM 40 MG: 40 TABLET, DELAYED RELEASE ORAL at 05:42

## 2019-06-24 RX ADMIN — APIXABAN 2.5 MG: 2.5 TABLET, FILM COATED ORAL at 08:59

## 2019-06-24 RX ADMIN — LATANOPROST 1 DROP: 50 SOLUTION OPHTHALMIC at 21:10

## 2019-06-24 RX ADMIN — APIXABAN 2.5 MG: 2.5 TABLET, FILM COATED ORAL at 17:06

## 2019-06-24 RX ADMIN — ANASTROZOLE 1 MG: 1 TABLET, COATED ORAL at 08:59

## 2019-06-24 RX ADMIN — SODIUM CHLORIDE 75 ML/HR: 0.9 INJECTION, SOLUTION INTRAVENOUS at 08:59

## 2019-06-25 VITALS
BODY MASS INDEX: 30.42 KG/M2 | TEMPERATURE: 99.2 F | HEIGHT: 57 IN | RESPIRATION RATE: 18 BRPM | DIASTOLIC BLOOD PRESSURE: 67 MMHG | OXYGEN SATURATION: 96 % | HEART RATE: 75 BPM | SYSTOLIC BLOOD PRESSURE: 136 MMHG | WEIGHT: 141 LBS

## 2019-06-25 PROBLEM — Z86.718 HISTORY OF DVT (DEEP VEIN THROMBOSIS): Status: RESOLVED | Noted: 2019-06-22 | Resolved: 2019-06-25

## 2019-06-25 PROCEDURE — G8987 SELF CARE CURRENT STATUS: HCPCS

## 2019-06-25 PROCEDURE — 97166 OT EVAL MOD COMPLEX 45 MIN: CPT

## 2019-06-25 PROCEDURE — 99231 SBSQ HOSP IP/OBS SF/LOW 25: CPT | Performed by: PHYSICIAN ASSISTANT

## 2019-06-25 PROCEDURE — 99239 HOSP IP/OBS DSCHRG MGMT >30: CPT | Performed by: INTERNAL MEDICINE

## 2019-06-25 PROCEDURE — G8988 SELF CARE GOAL STATUS: HCPCS

## 2019-06-25 RX ADMIN — APIXABAN 2.5 MG: 2.5 TABLET, FILM COATED ORAL at 08:13

## 2019-06-25 RX ADMIN — ANASTROZOLE 1 MG: 1 TABLET, COATED ORAL at 08:13

## 2019-06-25 RX ADMIN — PANTOPRAZOLE SODIUM 40 MG: 40 TABLET, DELAYED RELEASE ORAL at 05:53

## 2019-06-25 RX ADMIN — APIXABAN 2.5 MG: 2.5 TABLET, FILM COATED ORAL at 17:22

## 2020-03-05 ENCOUNTER — TELEPHONE (OUTPATIENT)
Dept: HEMATOLOGY ONCOLOGY | Facility: CLINIC | Age: 85
End: 2020-03-05

## 2020-03-05 NOTE — TELEPHONE ENCOUNTER
Patient called to confirm their appointment  Appointment confirmed for Dr Narda Long             Appointment date and time: 3-30-20 @ 1:20pm               [place] location:   [facility]             Patient verbalized understanding of above

## 2020-09-11 ENCOUNTER — TELEPHONE (OUTPATIENT)
Dept: HEMATOLOGY ONCOLOGY | Facility: CLINIC | Age: 85
End: 2020-09-11

## 2020-09-11 ENCOUNTER — TELEPHONE (OUTPATIENT)
Dept: HEMATOLOGY ONCOLOGY | Facility: MEDICAL CENTER | Age: 85
End: 2020-09-11

## 2020-09-11 NOTE — TELEPHONE ENCOUNTER
[facility] from [facility] called in to confirm this Monday appointment 09/14/2020

## 2020-09-14 ENCOUNTER — OFFICE VISIT (OUTPATIENT)
Dept: HEMATOLOGY ONCOLOGY | Facility: CLINIC | Age: 85
End: 2020-09-14
Payer: MEDICARE

## 2020-09-14 VITALS
TEMPERATURE: 99 F | SYSTOLIC BLOOD PRESSURE: 132 MMHG | DIASTOLIC BLOOD PRESSURE: 80 MMHG | OXYGEN SATURATION: 97 % | RESPIRATION RATE: 18 BRPM | HEART RATE: 72 BPM

## 2020-09-14 DIAGNOSIS — Z17.0 MALIGNANT NEOPLASM OF UPPER-OUTER QUADRANT OF RIGHT BREAST IN FEMALE, ESTROGEN RECEPTOR POSITIVE (HCC): Primary | ICD-10-CM

## 2020-09-14 DIAGNOSIS — C50.411 MALIGNANT NEOPLASM OF UPPER-OUTER QUADRANT OF RIGHT BREAST IN FEMALE, ESTROGEN RECEPTOR POSITIVE (HCC): Primary | ICD-10-CM

## 2020-09-14 PROCEDURE — 99214 OFFICE O/P EST MOD 30 MIN: CPT | Performed by: INTERNAL MEDICINE

## 2020-09-14 NOTE — PROGRESS NOTES
Hematology / Oncology Outpatient Follow Up Note    Lexie Guerra 80 y o  female :1919 [id number]         Date:  2020    Assessment / Plan:  A 8-year-old postmenopausal woman with clinical stage IIA right breast cancer, grade 2, % positive, % positive, HER2 negative disease   She does not wish to have surgical treatment for breast cancer   Since May 2018, she has been on hormonal therapy with anastrozole with no toxicity  Clinically, she has very good partial response with no further progression  I recommended her to continue with anastrozole 1 mg once a day  She is in agreement with my recommendation  I will see her again in a year for routine follow-up                               Subjective:      HPI: [de-identified] 80year old postmenopausal woman who recently noticed a lump in the right breast which she brought to medical attention   Mammography and ultrasound showed approximately 3 cm of mass, located at 10 o'clock position from nipple in the right breast   She underwent right breast ultrasound-guided biopsy which showed invasive ductal carcinoma, grade 2   This was % positive, % positive, HER2 negative disease   She was seen by Dr Patricia Mckeon yesterday  Sundeepscarlett Avendaño does not wish to have surgical treatment for the breast cancer   Therefore, she presents today to discuss medical treatment   She has no breast related symptomatology   She denied any pain   She has no respiratory symptoms   She has baseline ambulatory dysfunction   She normally use a walker for ambulation   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   Her performance status is 2/4 on the ECOG scale            Interval History:  A 8-year-old postmenopausal woman with clinical stage IIA right breast cancer, grade 2, % positive, % positive, HER2 negative disease   She does not wish to have surgical treatment for breast cancer   Since May 2018, she has been on hormonal therapy with anastrozole  She presents today for routine follow-up  She has baseline ambulatory dysfunction  Otherwise, she feels very well  She has no toxicity from anastrozole such as hot flashes or musculoskeletal symptoms  She already have very good partial response  She has no complaint of pain  She has no respiratory symptoms        Objective:      Primary Diagnosis:     Clinical stage IIA right breast cancer, grade 2, % positive, % positive, HER2 negative disease   Diagnosed in May 2018      Cancer Staging:  Cancer Staging  Malignant neoplasm of upper-outer quadrant of right breast in female, estrogen receptor positive ([facility] [address] )  Staging form: Breast, AJCC 8th Edition  - Clinical: Stage IB (cT2, cN0, cM0, G2, ER: Positive, ME: Positive, HER2: Negative) - Signed by Johnathon Leahy MD on 5/23/2018           Previous Hematologic/ Oncologic Treatment:            Current Hematologic/ Oncologic Treatment:       Hormonal therapy with anastrozole since May 2018      Disease Status:      Clinical good partial response      Test Results:     Pathology:     Right breast biopsy showed invasive ductal carcinoma, grade 2   % positive, % positive, HER2 negative disease      Radiology:     Mammography and ultrasound showed a 3 3 times to use x 2 cm of right breast mass located at 10 o'clock position 7 cm from nipple in the right breast      Laboratory:           Physical Exam:        General Appearance:    Alert, oriented          Eyes:    PERRL   Ears:    Normal external ear canals, both ears   Nose:   Nares normal, septum midline   Throat:   Mucosa moist  Pharynx without injection  Neck:   Supple         Lungs:     Clear to auscultation bilaterally   Chest Wall:    No tenderness or deformity    Heart:    Regular rate and rhythm         Abdomen:     Soft, non-tender, bowel sounds +, no organomegaly               Extremities:   Extremities no cyanosis or edema         Skin:   no rash or icterus      Lymph nodes:   Cervical, supraclavicular, and axillary nodes normal   Neurologic:   CNII-XII intact, normal strength, sensation and reflexes     Throughout             Breast exam:  Less than 1 cm of mass at outer upper quadrant of the right breast   Left breast exam is negative  ROS: Review of Systems   Neurological:        Ambulatory dysfunction   All other systems reviewed and are negative  Imaging: No results found  Labs:   Lab Results   Component Value Date    WBC 5 29 06/24/2019    HGB 11 9 06/24/2019    HCT 39 3 06/24/2019    MCV 98 06/24/2019     06/24/2019     Lab Results   Component Value Date     11/30/2015    K 4 0 06/24/2019     06/24/2019    CO2 26 06/24/2019    ANIONGAP 7 11/30/2015    BUN 18 06/24/2019    CREATININE 0 88 06/24/2019    GLUCOSE 111 11/30/2015    CALCIUM 8 4 06/24/2019    AST 20 06/23/2019    ALT 14 06/23/2019    ALKPHOS 83 06/23/2019    PROT 7 0 02/17/2015    BILITOT 0 5 02/17/2015    EGFR 54 06/24/2019         Lab Results   Component Value Date    IRON 50 11/14/2016    TIBC 150 (L) 11/14/2016    FERRITIN 87 11/14/2016         Current Medications: Reviewed  Allergies: Reviewed  PMH/FH/SH:  Reviewed      Vital Sign:    There is no height or weight on file to calculate BSA      Wt Readings from Last 3 Encounters:   06/25/19 64 kg (141 lb)   10/30/18 65 2 kg (143 lb 11 8 oz)   09/20/18 65 8 kg (145 lb)        Temp Readings from Last 3 Encounters:   09/14/20 99 °F (37 2 °C) (Tympanic Core)   06/25/19 99 2 °F (37 3 °C) (Temporal)   03/21/19 97 9 °F (36 6 °C)        BP Readings from Last 3 Encounters:   09/14/20 132/80   06/25/19 136/67   03/21/19 140/72         Pulse Readings from Last 3 Encounters:   09/14/20 72   06/25/19 75   03/21/19 83     @LASTSAO2(3)@

## 2021-06-14 ENCOUNTER — APPOINTMENT (OUTPATIENT)
Dept: RADIOLOGY | Facility: MEDICAL CENTER | Age: 86
End: 2021-06-14
Payer: MEDICARE

## 2021-06-14 ENCOUNTER — OFFICE VISIT (OUTPATIENT)
Dept: OBGYN CLINIC | Facility: MEDICAL CENTER | Age: 86
End: 2021-06-14
Payer: MEDICARE

## 2021-06-14 VITALS
TEMPERATURE: 97.7 F | SYSTOLIC BLOOD PRESSURE: 114 MMHG | BODY MASS INDEX: 30.51 KG/M2 | WEIGHT: 141 LBS | HEART RATE: 73 BPM | DIASTOLIC BLOOD PRESSURE: 63 MMHG

## 2021-06-14 DIAGNOSIS — M17.11 ARTHRITIS OF RIGHT KNEE: ICD-10-CM

## 2021-06-14 DIAGNOSIS — M17.11 ARTHRITIS OF RIGHT KNEE: Primary | ICD-10-CM

## 2021-06-14 PROCEDURE — 73564 X-RAY EXAM KNEE 4 OR MORE: CPT

## 2021-06-14 PROCEDURE — 20610 DRAIN/INJ JOINT/BURSA W/O US: CPT | Performed by: ORTHOPAEDIC SURGERY

## 2021-06-14 PROCEDURE — 99203 OFFICE O/P NEW LOW 30 MIN: CPT | Performed by: ORTHOPAEDIC SURGERY

## 2021-06-14 RX ORDER — METHYLPREDNISOLONE ACETATE 40 MG/ML
2 INJECTION, SUSPENSION INTRA-ARTICULAR; INTRALESIONAL; INTRAMUSCULAR; SOFT TISSUE
Status: COMPLETED | OUTPATIENT
Start: 2021-06-14 | End: 2021-06-14

## 2021-06-14 RX ORDER — LIDOCAINE HYDROCHLORIDE 10 MG/ML
3 INJECTION, SOLUTION INFILTRATION; PERINEURAL
Status: COMPLETED | OUTPATIENT
Start: 2021-06-14 | End: 2021-06-14

## 2021-06-14 RX ADMIN — LIDOCAINE HYDROCHLORIDE 3 ML: 10 INJECTION, SOLUTION INFILTRATION; PERINEURAL at 16:39

## 2021-06-14 RX ADMIN — METHYLPREDNISOLONE ACETATE 2 ML: 40 INJECTION, SUSPENSION INTRA-ARTICULAR; INTRALESIONAL; INTRAMUSCULAR; SOFT TISSUE at 16:39

## 2021-06-14 NOTE — PROGRESS NOTES
Assessment/Plan     1  Arthritis of right knee      Orders Placed This Encounter   Procedures    Large joint arthrocentesis: R knee    XR knee 4+ vw right injury     · Patient has severe right knee osteoarthritis   · Received  Right knee steroid injection today  Patient knows to ice and avoid strenuous activity for 1-2 days if needed  · Patient should be evaluated for pain in the left torso  · Received right knee  steroid injection today  Patient knows to ice and avoid strenuous activity for 1-2 days if needed  · Continue taking Tylenol and using lidocaine patches as needed for pain   · Physical therapy for the stretching and strengthening exercises   · May repeat steroid injection every 3 months if needed     Return if symptoms worsen or fail to improve  I answered all of the patient's questions during the visit and provided education of the patient's condition during the visit  The patient verbalized understanding of the information given and agrees with the plan  This note was dictated using Checkd.In software  It may contain errors including improperly dictated words  Please contact physician directly for any questions  History of Present Illness   Chief complaint:   Chief Complaint   Patient presents with    Right Knee - Pain       HPI: Milly Spangler is a 80 y o  female that c/o right knee pain  She has been having right knee pain for 2-3 weeks, denies any falls or trauma   She states she is having achy and soreness generalized right knee  She is not walking at all but previously she use to walk with a walker  She is currently in a wheelchair  She is on antibiotics for UTI  She is taking Tylenol and using Lidocaine patches for pain  She has no history having injection, surgeries or physical therapy on the right knee  She currently resides at [facility]  She is having left breast/chest  pain   She states sharp pain when breathing  Denies any shortness of breath        ROS:    See [facility] for musculoskeletal review     All other systems reviewed are negative     Historical Information   Past Medical History:   Diagnosis Date    Abdominal aortic aneurysm without rupture (HCC)     Anxiety     Atrial fibrillation (HCC)     Blood clot in vein     Bowel obstruction (HCC)     Cyst of pancreas     Depression     Depression     Difficulty walking     Diverticulitis     DVT (deep venous thrombosis) (HCC)     Dysphagia     Dysphagia, oropharyngeal phase     Frequent urination     Functional urinary incontinence     GERD (gastroesophageal reflux disease)     Hyperlipidemia     Hypertension     Long term (current) use of anticoagulants     Metabolic encephalopathy     Personal history of other malignant neoplasm of large intestine     Unspecified protein-calorie malnutrition (HCC)     Vascular disorder of intestine ([address] )     Wears glasses     Weight loss      Past Surgical History:   Procedure Laterality Date    ABDOMINAL SURGERY      BREAST BIOPSY Right 05/14/2018    HYSTERECTOMY      IVC FILTER INSERTION  1990    KIDNEY STONE SURGERY      LAPAROTOMY N/A 11/1/2016    Procedure: EXPLORATORY LAPAROTOMY WITH EXTENSIVE LYSIS OF ADHESIONS, SEGMENTAL SMALL BOWEL RESECTION WITH PRIMARY ANASTAMOSIS, REPAIR OF MULTIPLE SMALL BOWEL ENTEROTOMIES;  Surgeon: Eron Bennett MD;  Location: [facility] OR;  Service:    [address] [facility] BREAST BIOPSY RIGHT COMPLETE Right 5/14/2018     Social History   Social History     Substance and Sexual Activity   Alcohol Use Yes    Comment: special occasions     Social History     Substance and Sexual Activity   Drug Use No     Social History     Tobacco Use   Smoking Status Former Smoker   Smokeless Tobacco Never Used     Family History:   Family History   Family history unknown: Yes       Current Outpatient Medications on File Prior to Visit   Medication Sig Dispense Refill    acetaminophen (TYLENOL) 325 mg tablet Take 650 mg by mouth every 4 (four) hours      amLODIPine (NORVASC) 5 mg tablet Take 5 mg by mouth daily      anastrozole (ARIMIDEX) 1 mg tablet Take 1 mg by mouth daily      apixaban (ELIQUIS) 2 5 mg Take 1 tablet (2 5 mg total) by mouth 2 (two) times a day 60 tablet 0    Artificial Saliva (BIOTENE MOISTURIZING MOUTH MT) Apply 2 sprays to the mouth or throat every 2 (two) hours as needed (for dry mouth)      bisacodyl (DULCOLAX) 10 mg suppository Insert into the rectum daily as needed for constipation      calcium carbonate (OS-IZZY) 600 MG tablet Take 600 mg by mouth 2 (two) times a day with meals      Calcium Carbonate-Vitamin D 600-200 MG-UNIT TABS Take by mouth      Cholecalciferol (VITAMIN D3) 56270 units TABS Take by mouth      docusate sodium (COLACE) 100 mg capsule Take 1 capsule (100 mg total) by mouth 2 (two) times a day 10 capsule 0    glycerin adult 2 g suppository Insert 1 suppository into the rectum as needed for constipation      latanoprost (XALATAN) 0 005 % ophthalmic solution Administer 1 drop to both eyes daily at bedtime      Mouthwashes (BIOTENE DRY MOUTH GENTLE) LIQD Apply to the mouth or throat      Multiple Vitamin (MULTIVITAMINS PO) Take by mouth      Multiple Vitamins-Minerals (PRESERVISION AREDS 2) CAPS Take by mouth 2 (two) times a day      ondansetron (ZOFRAN) 4 mg tablet Take 4 mg by mouth every 4 (four) hours as needed for nausea or vomiting      polyethylene glycol (GLYCOLAX) powder Take 17 g by mouth daily for 30 days 510 g 0    saccharomyces boulardii (FLORASTOR) 250 mg capsule Take 1 capsule (250 mg total) by mouth 2 (two) times a day 10 capsule 0    sorbitol 70 % solution Take 30 mL by mouth daily as needed (for constipation)       No current facility-administered medications on file prior to visit       Allergies   Allergen Reactions    Ofloxacin        Current Outpatient Medications on File Prior to Visit   Medication Sig Dispense Refill    acetaminophen (TYLENOL) 325 mg tablet Take 650 mg by mouth every 4 (four) hours      amLODIPine (NORVASC) 5 mg tablet Take 5 mg by mouth daily      anastrozole (ARIMIDEX) 1 mg tablet Take 1 mg by mouth daily      apixaban (ELIQUIS) 2 5 mg Take 1 tablet (2 5 mg total) by mouth 2 (two) times a day 60 tablet 0    Artificial Saliva (BIOTENE MOISTURIZING MOUTH MT) Apply 2 sprays to the mouth or throat every 2 (two) hours as needed (for dry mouth)      bisacodyl (DULCOLAX) 10 mg suppository Insert into the rectum daily as needed for constipation      calcium carbonate (OS-IZZY) 600 MG tablet Take 600 mg by mouth 2 (two) times a day with meals      Calcium Carbonate-Vitamin D 600-200 MG-UNIT TABS Take by mouth      Cholecalciferol (VITAMIN D3) 72107 units TABS Take by mouth      docusate sodium (COLACE) 100 mg capsule Take 1 capsule (100 mg total) by mouth 2 (two) times a day 10 capsule 0    glycerin adult 2 g suppository Insert 1 suppository into the rectum as needed for constipation      latanoprost (XALATAN) 0 005 % ophthalmic solution Administer 1 drop to both eyes daily at bedtime      Mouthwashes (BIOTENE DRY MOUTH GENTLE) LIQD Apply to the mouth or throat      Multiple Vitamin (MULTIVITAMINS PO) Take by mouth      Multiple Vitamins-Minerals (PRESERVISION AREDS 2) CAPS Take by mouth 2 (two) times a day      ondansetron (ZOFRAN) 4 mg tablet Take 4 mg by mouth every 4 (four) hours as needed for nausea or vomiting      polyethylene glycol (GLYCOLAX) powder Take 17 g by mouth daily for 30 days 510 g 0    saccharomyces boulardii (FLORASTOR) 250 mg capsule Take 1 capsule (250 mg total) by mouth 2 (two) times a day 10 capsule 0    sorbitol 70 % solution Take 30 mL by mouth daily as needed (for constipation)       No current facility-administered medications on file prior to visit  Objective   Vitals: Blood pressure 114/63, pulse 73, temperature 97 7 °F (36 5 °C), weight 64 kg (141 lb)  ,Body mass index is 30 51 kg/m²      PE:  AAOx 3  WDWN  Hearing intact, no drainage from eyes  Regular rate  no audible wheezing  no abdominal distension  LE compartments soft, skin intact    rightknee:    Appearance:  no swelling   No ecchymosis  no obvious joint deformity   No effusion  Palpation/Tenderness:  +TTP over medial joint line  No TTP over lateral joint line   No TTP over patella  No TTP over patellar tendon  No TTP over pes anserine bursa  Active Range of Motion:  AROM: 45-90  PROM:    Special Tests:  Valgus Stress Test:  negative  Varus Stress Test:  negative     No ipsilateral hip pain with ROM    rightLE:    Sensation grossly intact  Palpable  pulse  AT/GS/EHL intact    Imaging Studies: I have personally reviewed pertinent films in PACS  rightknee:  Severe DJD     Large joint arthrocentesis: R knee  Universal Protocol:  Consent: Verbal consent obtained  Risks and benefits: risks, benefits and alternatives were discussed  Consent given by: patient  Time out: Immediately prior to procedure a "time out" was called to verify the correct patient, procedure, equipment, support staff and site/side marked as required    Timeout called at: 6/14/2021 4:39 PM   Patient understanding: patient states understanding of the procedure being performed  Site marked: the operative site was marked  Supporting Documentation  Indications: pain   Procedure Details  Location: knee - R knee  Preparation: Patient was prepped and draped in the usual sterile fashion  Needle size: 22 G  Ultrasound guidance: no  Approach: anterolateral  Medications administered: 3 mL lidocaine 1 %; 2 mL methylPREDNISolone acetate 40 mg/mL    Patient tolerance: patient tolerated the procedure well with no immediate complications  Dressing:  Sterile dressing applied        Scribe Attestation    I,:  Elliott Mccoy am acting as a scribe while in the presence of the attending physician :       I,:  Lesley Pollard DO personally performed the services described in this documentation    as scribed in my presence :

## 2021-06-14 NOTE — PATIENT INSTRUCTIONS
Patient should be evaluated for pain in the left torso  Received right knee  steroid injection today  Patient knows to ice and avoid strenuous activity for 1-2 days if needed       Continue taking Tylenol and using lidocaine patches as needed for pain   Physical therapy for the stretching and strengthening exercises for the right knee   May repeat steroid injection every 3 months if needed   Follow up PRN

## 2021-09-17 ENCOUNTER — TELEPHONE (OUTPATIENT)
Dept: HEMATOLOGY ONCOLOGY | Facility: CLINIC | Age: 86
End: 2021-09-17

## 2021-09-17 NOTE — TELEPHONE ENCOUNTER
Appointment Confirmation     Appointment with  Dr Maynard Stage   Appointment date & time  09/20 at 1:40 pm    Location [place]   Patient verbilized Understanding

## 2021-09-20 ENCOUNTER — OFFICE VISIT (OUTPATIENT)
Dept: HEMATOLOGY ONCOLOGY | Facility: CLINIC | Age: 86
End: 2021-09-20
Payer: MEDICARE

## 2021-09-20 VITALS
RESPIRATION RATE: 18 BRPM | OXYGEN SATURATION: 93 % | DIASTOLIC BLOOD PRESSURE: 72 MMHG | HEART RATE: 74 BPM | TEMPERATURE: 98.8 F | SYSTOLIC BLOOD PRESSURE: 142 MMHG

## 2021-09-20 DIAGNOSIS — C50.411 MALIGNANT NEOPLASM OF UPPER-OUTER QUADRANT OF RIGHT BREAST IN FEMALE, ESTROGEN RECEPTOR POSITIVE (HCC): Primary | ICD-10-CM

## 2021-09-20 DIAGNOSIS — Z17.0 MALIGNANT NEOPLASM OF UPPER-OUTER QUADRANT OF RIGHT BREAST IN FEMALE, ESTROGEN RECEPTOR POSITIVE (HCC): Primary | ICD-10-CM

## 2021-09-20 PROCEDURE — 99214 OFFICE O/P EST MOD 30 MIN: CPT | Performed by: INTERNAL MEDICINE

## 2021-09-20 NOTE — PROGRESS NOTES
Hematology / Oncology Outpatient Follow Up Note    Lilibeth Solis 80 y o  female :1919 [id number]         Date:  2021    Assessment / Plan:  A 8-year-old postmenopausal woman with clinical stage IIA right breast cancer, grade 2, % positive, % positive, HER2 negative disease   She does not wish to have surgical treatment for breast cancer   Since May 2018, she has been on hormonal therapy with anastrozole with no toxicity  She achieved  Clinical good partial response on anastrozole  Based on the today's exam, she has no evidence of progressive disease  I recommended her to continue with anastrozole 1 mg once a day  I will see her again in a year for routine follow-up    She is in agreement with my recommendations                         Subjective:      HPI: [de-identified] 80year old postmenopausal woman who recently noticed a lump in the right breast which she brought to medical attention   Mammography and ultrasound showed approximately 3 cm of mass, located at 10 o'clock position from nipple in the right breast   She underwent right breast ultrasound-guided biopsy which showed invasive ductal carcinoma, grade 2   This was % positive, % positive, HER2 negative disease   She was seen by Dr Tosin Mcbride yesterday  Esmer Zelaya does not wish to have surgical treatment for the breast cancer   Therefore, she presents today to discuss medical treatment   She has no breast related symptomatology   She denied any pain   She has no respiratory symptoms   She has baseline ambulatory dysfunction   She normally use a walker for ambulation   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   Her performance status is 2/4 on the ECOG scale            Interval History:  A 8-year-old postmenopausal woman with clinical stage IIA right breast cancer, grade 2, % positive, % positive, HER2 negative disease   She does not wish to have surgical treatment for breast cancer   Since May 2018, she has been on hormonal therapy with anastrozole   She presents today for routine follow-up  She has no new complaint  She denied hot flashes or musculoskeletal symptoms  She denied any pain  She has no respiratory symptoms  She has a complaint of bilateral knee problem with which she has some ambulatory dysfunction        Objective:      Primary Diagnosis:     Clinical stage IIA right breast cancer, grade 2, % positive, % positive, HER2 negative disease   Diagnosed in May 2018      Cancer Staging:  Cancer Staging  Malignant neoplasm of upper-outer quadrant of right breast in female, estrogen receptor positive ([facility] [address] )  Staging form: Breast, AJCC 8th Edition  - Clinical: Stage IB (cT2, cN0, cM0, G2, ER: Positive, MO: Positive, HER2: Negative) - Signed by Joelle Gleason MD on 5/23/2018           Previous Hematologic/ Oncologic Treatment:            Current Hematologic/ Oncologic Treatment:       Hormonal therapy with anastrozole since May 2018      Disease Status:      Clinical good partial response      Test Results:     Pathology:     Right breast biopsy showed invasive ductal carcinoma, grade 2   % positive, % positive, HER2 negative disease      Radiology:     Mammography and ultrasound showed a 3 3 x 2 cm of right breast mass located at 10 o'clock position 7 cm from nipple in the right breast      Laboratory:           Physical Exam:        General Appearance:    Alert, oriented          Eyes:    PERRL   Ears:    Normal external ear canals, both ears   Nose:   Nares normal, septum midline   Throat:   Mucosa moist  Pharynx without injection  Neck:   Supple         Lungs:     Clear to auscultation bilaterally   Chest Wall:    No tenderness or deformity    Heart:    Regular rate and rhythm         Abdomen:     Soft, non-tender, bowel sounds +, no organomegaly               Extremities:   Extremities no cyanosis or edema         Skin:   no rash or icterus  Lymph nodes:   Cervical, supraclavicular, and axillary nodes normal   Neurologic:   CNII-XII intact, normal strength, sensation and reflexes     Throughout             Breast exam:  Less than 1 cm of mass at outer upper quadrant of the right breast   Left breast exam is negative  ROS: Review of Systems   All other systems reviewed and are negative  Imaging: No results found  Labs:   Lab Results   Component Value Date    WBC 5 29 06/24/2019    HGB 11 9 06/24/2019    HCT 39 3 06/24/2019    MCV 98 06/24/2019     06/24/2019     Lab Results   Component Value Date     11/30/2015    K 4 0 06/24/2019     06/24/2019    CO2 26 06/24/2019    ANIONGAP 7 11/30/2015    BUN 18 06/24/2019    CREATININE 0 88 06/24/2019    GLUCOSE 111 11/30/2015    CALCIUM 8 4 06/24/2019    AST 20 06/23/2019    ALT 14 06/23/2019    ALKPHOS 83 06/23/2019    PROT 7 0 02/17/2015    BILITOT 0 5 02/17/2015    EGFR 54 06/24/2019         Lab Results   Component Value Date    IRON 50 11/14/2016    TIBC 150 (L) 11/14/2016    FERRITIN 87 11/14/2016         Current Medications: Reviewed  Allergies: Reviewed  PMH/FH/SH:  Reviewed      Vital Sign:    There is no height or weight on file to calculate BSA      Wt Readings from Last 3 Encounters:   06/14/21 64 kg (141 lb)   06/25/19 64 kg (141 lb)   10/30/18 65 2 kg (143 lb 11 8 oz)        Temp Readings from Last 3 Encounters:   09/20/21 98 8 °F (37 1 °C) (Tympanic Core)   06/14/21 97 7 °F (36 5 °C)   09/14/20 99 °F (37 2 °C) (Tympanic Core)        BP Readings from Last 3 Encounters:   09/20/21 142/72   06/14/21 114/63   09/14/20 132/80         Pulse Readings from Last 3 Encounters:   09/20/21 74   06/14/21 73   09/14/20 72     @LASTSAO2(3)@

## 2022-08-17 ENCOUNTER — TELEPHONE (OUTPATIENT)
Dept: HEMATOLOGY ONCOLOGY | Facility: CLINIC | Age: 87
End: 2022-08-17